# Patient Record
Sex: FEMALE | Race: BLACK OR AFRICAN AMERICAN | NOT HISPANIC OR LATINO | Employment: FULL TIME
[De-identification: names, ages, dates, MRNs, and addresses within clinical notes are randomized per-mention and may not be internally consistent; named-entity substitution may affect disease eponyms.]

---

## 2017-09-03 ENCOUNTER — HEALTH MAINTENANCE LETTER (OUTPATIENT)
Age: 37
End: 2017-09-03

## 2018-05-14 ENCOUNTER — CARE COORDINATION (OUTPATIENT)
Dept: SURGERY | Facility: CLINIC | Age: 38
End: 2018-05-14

## 2018-05-14 NOTE — PROGRESS NOTES
Called and left message in regards to patient needing to complete questionnaire prior to appointment and if unable needs to come to clinic at 8 am. I left my number for patient to contact me with any questions or concerns.

## 2018-06-04 ENCOUNTER — CARE COORDINATION (OUTPATIENT)
Dept: SURGERY | Facility: CLINIC | Age: 38
End: 2018-06-04

## 2018-06-04 NOTE — PROGRESS NOTES
Left message with my number for patient to call back to discuss new patient question. Also let her know to go on her my chart and she could complete questionnaire there also.

## 2018-06-06 ENCOUNTER — ALLIED HEALTH/NURSE VISIT (OUTPATIENT)
Dept: GASTROENTEROLOGY | Facility: CLINIC | Age: 38
End: 2018-06-06
Payer: COMMERCIAL

## 2018-06-06 ENCOUNTER — OFFICE VISIT (OUTPATIENT)
Dept: SURGERY | Facility: CLINIC | Age: 38
End: 2018-06-06
Payer: COMMERCIAL

## 2018-06-06 VITALS
HEART RATE: 106 BPM | HEIGHT: 62 IN | WEIGHT: 293 LBS | BODY MASS INDEX: 53.92 KG/M2 | SYSTOLIC BLOOD PRESSURE: 148 MMHG | DIASTOLIC BLOOD PRESSURE: 96 MMHG | TEMPERATURE: 99 F | OXYGEN SATURATION: 97 %

## 2018-06-06 DIAGNOSIS — R06.83 SNORING: Primary | ICD-10-CM

## 2018-06-06 DIAGNOSIS — E88.09 HYPOALBUMINEMIA DUE TO PROTEIN-CALORIE MALNUTRITION (H): ICD-10-CM

## 2018-06-06 DIAGNOSIS — E46 HYPOALBUMINEMIA DUE TO PROTEIN-CALORIE MALNUTRITION (H): ICD-10-CM

## 2018-06-06 DIAGNOSIS — E55.9 VITAMIN D DEFICIENCY: Primary | ICD-10-CM

## 2018-06-06 DIAGNOSIS — E66.01 MORBID OBESITY (H): ICD-10-CM

## 2018-06-06 DIAGNOSIS — E66.01 MORBID OBESITY (H): Primary | ICD-10-CM

## 2018-06-06 DIAGNOSIS — D50.8 IRON DEFICIENCY ANEMIA SECONDARY TO INADEQUATE DIETARY IRON INTAKE: ICD-10-CM

## 2018-06-06 DIAGNOSIS — Z71.3 NUTRITIONAL COUNSELING: ICD-10-CM

## 2018-06-06 LAB
ALBUMIN SERPL-MCNC: 3 G/DL (ref 3.4–5)
ALP SERPL-CCNC: 118 U/L (ref 40–150)
ALT SERPL W P-5'-P-CCNC: 15 U/L (ref 0–50)
ANION GAP SERPL CALCULATED.3IONS-SCNC: 8 MMOL/L (ref 3–14)
AST SERPL W P-5'-P-CCNC: 11 U/L (ref 0–45)
BILIRUB SERPL-MCNC: 0.3 MG/DL (ref 0.2–1.3)
BUN SERPL-MCNC: 10 MG/DL (ref 7–30)
CALCIUM SERPL-MCNC: 9.1 MG/DL (ref 8.5–10.1)
CHLORIDE SERPL-SCNC: 102 MMOL/L (ref 94–109)
CO2 SERPL-SCNC: 27 MMOL/L (ref 20–32)
CREAT SERPL-MCNC: 0.72 MG/DL (ref 0.52–1.04)
ERYTHROCYTE [DISTWIDTH] IN BLOOD BY AUTOMATED COUNT: 15.6 % (ref 10–15)
GFR SERPL CREATININE-BSD FRML MDRD: >90 ML/MIN/1.7M2
GLUCOSE SERPL-MCNC: 90 MG/DL (ref 70–99)
HBA1C MFR BLD: 6 % (ref 0–5.6)
HCT VFR BLD AUTO: 39.7 % (ref 35–47)
HGB BLD-MCNC: 12 G/DL (ref 11.7–15.7)
MCH RBC QN AUTO: 23.8 PG (ref 26.5–33)
MCHC RBC AUTO-ENTMCNC: 30.2 G/DL (ref 31.5–36.5)
MCV RBC AUTO: 79 FL (ref 78–100)
PLATELET # BLD AUTO: 535 10E9/L (ref 150–450)
POTASSIUM SERPL-SCNC: 4 MMOL/L (ref 3.4–5.3)
PROT SERPL-MCNC: 8.4 G/DL (ref 6.8–8.8)
PTH-INTACT SERPL-MCNC: 92 PG/ML (ref 18–80)
RBC # BLD AUTO: 5.05 10E12/L (ref 3.8–5.2)
SODIUM SERPL-SCNC: 137 MMOL/L (ref 133–144)
WBC # BLD AUTO: 12.7 10E9/L (ref 4–11)

## 2018-06-06 NOTE — MR AVS SNAPSHOT
"              After Visit Summary   6/6/2018    Svetlana Perry    MRN: [id number]           Patient Information     Date Of Birth          1980        Visit Information        Provider Department      6/6/2018 11:00 AM Mabel Agrawal RD [facility]        Care Instructions    GOALS:  Relating To Eating:  Eat slowly (20-30 minutes per meal), chewing foods well (25 chews per bite/applesauce consistency)  9\" Plate method (1/2 plate non-starchy vegetables/fruit, 1/4 plate lean protein, 1/4 plate whole grain starch - no more than 1/2 cup carb/meal)  Focus on lean protein and non-starchy vegetables/whole fruit at each meal with no more than 1 cup of carbs or 2 slices of bread  Record food and beverage intake (continue using spark people joana or other joana you like or notebook or online).  Eat breakfast daily, within 60 minutes of waking  Avoid distractions such as tv and computer while eating.  Eat 3 balanced meals per day. Okay to replace one meal per day with a protein drink.  Avoid snacking unless planned due to a long stretch (longer than 4-6 hours between meals) or truly hungry then have a healthy planned snack of 100 calories per less (fruit serving, lowfat cheese stick, Hard boiled egg, 12 almonds or cashews, vegetables with 2 T hummus).    Relating to beverages:  Reduce calorie containing beverages (juice) down to 8 oz or less per day. (with eventual goal of eliminating).  Avoid carbonated drinks    Relating to dietary supplements:  Continue taking a multivitamin containing iron daily    Relating to activity:  Increase activity level.  Exercise 4 days/week for 30 minutes    Relating to cravings:  Anytime you have a craving, find an activity and do it for 15 minutes to see if craving goes away    Follow-Up:   Recommend follow up visit in 1 month to assist with lifestyle changes or per insurance.  Mabel Agrawal, MS, RD, LD          Follow-ups after your visit        Future tests " that were ordered for you today     Open Future Orders        Priority Expected Expires Ordered    CBC with platelets Routine  6/6/2019 6/6/2018    Comprehensive metabolic panel Routine  6/6/2019 6/6/2018    Hemoglobin A1c Routine  9/4/2018 6/6/2018    Vitamin D Deficiency Routine  6/6/2019 6/6/2018    Parathyroid Hormone Intact Routine  6/6/2019 6/6/2018    SLEEP EVALUATION & MANAGEMENT REFERRAL - [facility]  [phone] (Age 18 and up) Routine  6/6/2019 6/6/2018            Who to contact     Please call your clinic at [phone] to:    Ask questions about your health    Make or cancel appointments    Discuss your medicines    Learn about your test results    Speak to your doctor            Additional Information About Your Visit        [employer] Information     [employer]ept gives you secure access to your electronic health record. If you see a primary care provider, you can also send messages to your care team and make appointments. If you have questions, please call your primary care clinic.  If you do not have a primary care provider, please call [phone] and they will assist you.      Re[employer]ept is an electronic gateway that provides easy, online access to your medical records. With Re[employer]ept, you can request a clinic appointment, read your test results, renew a prescription or communicate with your care team.     To access your existing account, please contact your [facility] or call [phone] for assistance.        Care EveryWhere ID     This is your Care EveryWhere ID. This could be used by other organizations to access your [facility] medical records  [id number]         Blood Pressure from Last 3 Encounters:   06/06/18 (!) 148/96   05/01/15 122/88   02/07/12 122/73    Weight from Last 3 Encounters:   06/06/18 (!) 170.7 kg (376 lb 4.8 oz)   05/01/15 (!) 152 kg (335 lb)   02/07/12 (!) 145.8 kg (321 lb 6.9 oz)              Today, you had the  following     No orders found for display       Primary Care Provider Office Phone # Fax #    Edel Herzog [phone] [address]  [facility] [address]        Equal Access to Services     ENOCH MEDRANO : Hadii sherry mcneil paraso Sonerisali, waaxda luqadaha, qaybta kaalmada adeegyada, sigrid albertn sherri herron cordell dow. So [facility] [phone].    ATENCIÓN: Si habla español, tiene a ghosh disposición servicios gratuitos de asistencia lingüística. Llame al [phone].    We comply with applicable federal civil rights laws and [place] laws. We do not discriminate on the basis of race, color, national origin, age, disability, sex, sexual orientation, or gender identity.            Thank you!     Thank you for choosing [facility]  for your care. Our goal is always to provide you with excellent care. Hearing back from our patients is one way we can continue to improve our services. Please take a few minutes to complete the written survey that you may receive in the mail after your visit with us. Thank you!             Your Updated Medication List - Protect others around you: Learn how to safely use, store and throw away your medicines at www.disposemymeds.org.          This list is accurate as of 6/6/18 12:02 PM.  Always use your most recent med list.                   Brand Name Dispense Instructions for use Diagnosis    buPROPion 75 MG tablet    WELLBUTRIN    60 tablet    Take 1 tablet (75 mg) by mouth 2 times daily    Depression, Anxiety

## 2018-06-06 NOTE — PROGRESS NOTES
"New Bariatric Nutrition Consultation Note    Reason For Visit: Nutrition Assessment    Svetlana Perry is a 37 year old presenting today for new bariatric nutrition consult.  Pt is interested in laparoscopic sleeve gastrectomy with Dr. Reyna with possible surgery in September 2018. This is patient's 1st of 3 required pre-op visits. Patient is accompanied by her spouse.    She is interested in having weight loss surgery for the following reasons:  To improved health    Support System Reviewed With Patient 5/15/2018   Who do you have in your support network that can be available to help you for the first 2 weeks after surgery? Mother, Daughter, Sister and Spouse   Who can you count on for support throughout your weight loss surgery journey? Mother, Daughter, Sister, Spouse, Friends and Bariatric [place] Community (online and in-person support groups)       ANTHROPOMETRICS:  Estimated body mass index is 67.94 kg/(m^2) as calculated from the following:    Height as of an earlier encounter on 6/6/18: 1.585 m (5' 2.4\").    Weight as of an earlier encounter on 6/6/18: 170.7 kg (376 lb 4.8 oz).    Required weight loss goal pre-op: -37 lbs from initial consult weight (goal weight 339 lbs or less before surgery)       6/6/2018   I have tried the following methods to lose weight Watching portions or calories, Exercise, Weight Watchers, Atkins type diet (low carb/high protein), Prescription Medications       Weight Loss Questions Reviewed With Patient 6/6/2018   How long have you been overweight? Since late 20's to early 40's       SUPPLEMENT INFORMATION:  Prenatal vitamin with iron and biotin.    NUTRITION HISTORY: Patient reported skips meals (1-2 per day) may be breakfast or lunch.  Finds that mid-day (~2-4pm, she gets very hungry and then comes home and will snack. Tends to have potato chips before snack before dinner/mid-day and another one if skips a meal such as nuts/granola. Used to drink soda but not over past month " and a half. Reported that she loves juice and drinks 32 oz per day. Uses Spark people phone joana. Eats lunch at work desk while using computer but trying to stop this. Eats dinner or meals at home in front of the TV. Will eat her breakfast meal when having one in car.    Recall Diet Questions Reviewed With Patient 6/6/2018   Describe what you typically consume for breakfast (typical or most recent): Premier protein drink OR [employer] bagel with cream cheese OR 2 HB eggs   Describe what you typically consume for lunch (typical or most recent): fast food, leftovers, OR premier protein    Describe what you typically consume for supper (typical or most recent): Chicken/fish with usually mashed potato occas rice and spinach/annette/mustard greens/turnip green OR 2 slices hand tossed cheese pizza OR 2-3 hard shell tacos (beef, L, T, sour cream, guac, cilantro and cheese and salsa   Describe what you typically consume as snacks (typical or most recent): May have nuts or granola bar if skips a meal beef jerky, snack bars, candy, chips   How many ounces of water, or other low calorie drinks, do you drink daily (8 oz=1 glass)? 60 oz per day recently   How many ounces of caffeine (coffee, tea, pop) do you drink daily (8 oz=1 glass)? 8 oz black/green tea with honey   How many ounces of carbonated (pop, beer, sparkling water) drinks do you drinky daily (8 oz=1 glass)? None over past month and a half   How many ounces of juice, pop, sweet tea, sports drinks, protein drinks, other sweetened drinks, do you drink daily (8 oz=1 glass)? 32 oz cran-grape or grape juice or apple juice. [place]ham energy drinks but none over the past two week   How many ounces of milk do you drink daily (8 oz=1 glass) 8 oz or less per day only on cereal   Please indicate the type of milk: 1%   How often do you drink alcohol? none       Eating Habits 6/6/2018   Do you have any dietary restrictions? -   Do you currently binge eat (eat a large amount of food in a  short time)? Yes   Are you an emotional eater? Yes   Do you get up to eat after falling asleep? No   What foods do you crave? suger       ADDITIONAL INFORMATION:      Dining Out History Reviewed With Patient 6/6/2018   How often do you dine out? A couple of times a week.   Where do you dine out? (select all that apply) sit-down restaurants, fast food chains, take out   What types of food do you order when you dine out? varies       Physical Activity Reviewed With Patient 5/15/2018   How often do you exercise? 1 to 2 times per week   What is the duration of your exercise (in minutes)? 20 Minutes   What types of exercise do you do? walking, gym membership, swimming, home gym but has not put together yet, has a stationary bike at home, also rowing machine at home climbing stairs at work   What keeps you from being more active?  I should be more active but I just have not gotten around to it, Shortness of breath, Too tired       NUTRITION DIAGNOSIS:  Food and nutrition-related knowledge deficit related to lack of previous diet education re: WLS as evidenced by pt report.    Obesity r/t long history of self-monitoring deficit and excessive energy intake aeb BMI >30.    INTERVENTION:  Intervention Provided/Education Provided on post-op diet guidelines, vitamins/minerals essential post-operatively, GI anatomy of bariatric surgeries, ways to help prepare for post-op diet guidelines pre-operatively, portion/calorie-control, mindful eating. Recommended using The Plate method for guidance on getting balanced meals and portion sizes. Recommended pt eat 3 balanced meals per day, no skipping. Told pt okay to replace one meal per day with a protein drink and reviewed criteria for protein drinks. Encouraged her to avoid eating meals while driving or watching tv or using computer. Explained recommendation to avoid liquid calories/juice and recommended decreasing at this time down to 1 c or less per day with goal of eliminating. See  "all recommendations/goals below. Provided pt with list of goals RD contact information.      Questions Reviewed With Patient 5/15/2018   How ready are you to make changes regarding your weight? Number 1 = Not ready at all to make changes up to 10 = very ready. 9   How confident are you that you can change? 1 = Not confident that you will be successful making changes up to 10 = very confident. 9     Patient Understanding: good  Expected Compliance: good    GOALS:  Relating To Eating:  Eat slowly (20-30 minutes per meal), chewing foods well (25 chews per bite/applesauce consistency)  9\" Plate method (1/2 plate non-starchy vegetables/fruit, 1/4 plate lean protein, 1/4 plate whole grain starch - no more than 1/2 cup carb/meal)  Focus on lean protein and non-starchy vegetables/whole fruit at each meal with no more than 1 cup of carbs or 2 slices of bread  Record food and beverage intake (continue using spark people joana or other joana you like or notebook or online).  Eat breakfast daily, within 60 minutes of waking  Avoid distractions such as tv and computer while eating.  Eat 3 balanced meals per day. Okay to replace one meal per day with a protein drink.  Avoid snacking unless planned due to a long stretch (longer than 4-6 hours between meals) or truly hungry then have a healthy planned snack of 100 calories per less (fruit serving, lowfat cheese stick, Hard boiled egg, 12 almonds or cashews, vegetables with 2 T hummus).    Relating to beverages:  Reduce calorie containing beverages (juice) down to 8 oz or less per day. (with eventual goal of eliminating).  Avoid carbonated drinks  Continue drinking 48-64 oz water per day    Relating to dietary supplements:  Continue taking a multivitamin containing iron daily    Relating to activity:  Increase activity level.  Exercise 4 days/week for 30 minutes    Relating to cravings:  Anytime you have a craving, find an activity and do it for 15 minutes to see if craving goes " away    Follow-Up:   Recommend follow up visit in 1 month to assist with lifestyle changes or per insurance.    Time spent with patient: 60 minutes.  Mabel Agrawal, MS, RD, LD

## 2018-06-06 NOTE — LETTER
"2018       RE: Svetlana Perry  [address] [place] [address]     Dear Colleague,    Thank you for referring your patient, Svetlana Perry, to the [facility]NAGEMENT at [facility]. Please see a copy of my visit note below.        New Bariatric Surgery Consultation Note    RE: Svetlana Perry  MR#: [id number]  : 1980      Referring provider: No flowsheet data found.    Chief Complaint/Reason for visit: evaluation for possible weight loss surgery    Dear Edel Herzog MD (General),    I had the pleasure of seeing your patient, Svetlana Perry, to evaluate her obesity and consider her for possible weight loss surgery. As you know, Svetlana Perry is 37 year old.  She has a height of 5' 2.402\", a weight of 376 lbs 4.8 oz, and calculated Body mass index is 67.94 kg/(m^2).    HISTORY OF PRESENT ILLNESS:  Weight Loss History Reviewed with Patient 2018   How long have you been overweight? Since late 20's to early 40's   What is the most that you have ever weighed? 379   What is the most weight you have lost? 30   I have tried the following methods to lose weight Watching portions or calories, Exercise, Weight Watchers, Atkins type diet (low carb/high protein), Prescription Medications   I have tried the following weight loss medications? (Check all that apply) Phentermine/Adipex-p/Suprenza   Have you ever had weight loss surgery? No   Took phentermine in the past and lost 30 lbs but discontinued due to side effects of insomnia, anxiety    CO-MORBIDITIES OF OBESITY INCLUDE:     2018   I have the following co-morbidities associated with obesity: Pre-Diabetes, Weight Bearing Joint Pain       PAST MEDICAL HISTORY:  Past Medical History:   Diagnosis Date     Anxiety      Depression      Esophageal reflux     After child birth     Hearing problem     No longer require hearing aid in right ear     Kidney stone 2009    Kidney " stones removed     Reflux        PAST SURGICAL HISTORY:  Past Surgical History:   Procedure Laterality Date     COLOSTOMY  1[birth year]    as an infant for ?etiology-closed at 8 months of age without problems since     GALLSTONE REMOVAL[       MAMMOPLASTY REDUCTION BILATERAL  2/7/2012    Procedure:MAMMOPLASTY REDUCTION BILATERAL; BILATERAL REDUCTION MAMMOPLASTY; Surgeon:JAROCHO WEISS; Location:[facility]       FAMILY HISTORY:   Family History   Problem Relation Age of Onset     DIABETES Maternal Grandmother      Depression Mother      Obesity Mother      Obesity Sister        SOCIAL HISTORY:   Social History Questions Reviewed With Patient 6/6/2018   Which best describes your employment status (select all that apply) -   If you work, what is your occupation? -   Which best describes your marital status: -   Do you have children? -   Who do you have in your support network that can be available to help you for the first 2 weeks after surgery? -   Who can you count on for support throughout your weight loss surgery journey? -   Can you afford 3 meals a day?  Yes   Can you afford 50-60 dollars a month for vitamins? Yes   Works as , desk job full time  Lives with partner  1 child age 11  Good support system      HABITS:     5/15/2018   How often do you drink alcohol? Never   Do you currently use any of the following Nicotine products? No   Have you ever used any of the following nicotine products? No   Have you or are you currently using street drugs or prescription strength medication for which you do not have a prescription for? No   Do you have a history of chemical dependency (alcohol or drug abuse)? No     PSYCHOLOGICAL HISTORY:   Psychological History Reviewed With Patient 5/15/2018   Have you ever attempted suicide? More than 10 years ago.   Have you had thoughts of suicide in the past year? No   Have you ever been hospitalized for mental illness or a suicide attempt? Never.   Do you have a history of  "chronic pain? No   Have you ever been diagnosed with fibromyalgia? No   Are you currently being treated for any of the following? (select all that apply) I do not have a mental illness   Attempted suicide teenage years    ROS:     5/15/2018   Skin:  Leg swelling   HEENT: Dizziness/lightheadedness, Missing teeth   If you answered yes to missing teeth, please indicate how many: 5   Musculoskeletal: Joint Pain, Back pain, Swelling of legs   Cardiovascular: Shortness of breath with activity   Pulmonary: Shortness of breath with activity, Snoring   Gastrointestinal: Reflux, Diarrhea, Constipation   Genitourinary: None of the above   Hematological: None of the above   Neurological: None of the above   Female only: Irregular menstrual cycles       EATING BEHAVIORS:     6/6/2018   Have you or anyone else thought that you had an eating disorder? -   Do you currently binge eat (eat a large amount of food in a short time)? Yes   Are you an emotional eater? Yes   Do you get up to eat after falling asleep? No       EXERCISE:     5/15/2018   How often do you exercise? 1 to 2 times per week   What is the duration of your exercise (in minutes)? 20 Minutes   What types of exercise do you do? walking, gym membership, swimming, home gym, climbing stairs at work   What keeps you from being more active?  I should be more active but I just have not gotten around to it, Shortness of breath, Too tired       MEDICATIONS:  Current Outpatient Prescriptions   Medication     buPROPion (WELLBUTRIN) 75 MG tablet     No current facility-administered medications for this visit.        ALLERGIES:  Allergies   Allergen Reactions     Seasonal Allergies      Vicodin [Hydrocodone-Acetaminophen] Nausea and Vomiting       LABS/IMAGING/MEDICAL RECORDS REVIEW:     PHYSICAL EXAM:  BP (!) 148/96  Pulse 106  Temp 99  F (37.2  C) (Oral)  Ht 5' 2.4\"  Wt (!) 376 lb 4.8 oz  SpO2 97%  BMI 67.94 kg/m2  General: NAD  Neurologic: A & O x 3, gait normal  Head: " "normocephalic, atraumatic  HEENT: PERRL, EOMI.   Respiratory: respirations unlabored  Abdomen: Obese, Soft NT ND   Extremities: No LE swelling   Skin: warm and dry.  No rashes on exposed skin  Psychiatric: Mentation and Affect appear normal    In summary, Svetlana Perry has Class III obesity with a body mass index of Body mass index is 67.94 kg/(m^2). kg/m2 and the comorbidities stated above. She completed an informational seminar and is a candidate for the laparoscopic gastric sleeve.  She will have to complete the following pre-requisites:    Possible lap sleeve gastrectomy in Sept 2018 with Dr Reyna  See dietitian in 1 month    Labs today first floor    Call to schedule psychological eval ASAP    Bariatric Task List  Status:  Is patient a candidate for bariatric surgery?:  Yes -     Status:  surgery evaluation in process -     Surgeon: Dr Reyna -     Tentative surgery month/year: Sept 2018 -        Insurance: Insurance:  [employer] -        Patient Info: Initial Weight:  376 -     Date of Initial Weight/Height:  6/6/2018 -     Goal Weight (lbs):  339 -     Required Weight Loss:  37 -     Surgery Type:  sleeve gastrectomy -        Dietician Visits: Structured weight loss required by insurance?:  Yes -     Dietician Visit 1:  Completed -     Dietician Visit 2:  Needed -     Dietician Visit 3:  Needed -     Dietician Visit additional:    -        Psychological Evaluation: Psych eval:  Needed -        Lab Work: Complete Blood Count:  Needed -     Comprehensive Metabolic Panel:  Needed -     Vitamin D:  Needed -     Hgb A1c:  Needed -     PTH:  Needed -        Consults/ Clearance: Sleep Medicine:  Needed -        PCP: Establish care with PCP:  Completed -     PCP letter of support:  Needed -        Patient Education:  Information Session:  Completed -     Given \"Making your decision\" handout?:  Yes -     Given support group information?:  Yes -     Support plan in place?:  Completed -     Research " consents signed?:  Yes -        Final Tasks:  Before surgery online class:  Needed -     Before surgery online class website link:  [email or web address]   After surgery online class:  Needed -     After surgery online class website link:  [email or web address]   Nurse visit for weigh-in and information:  Needed -     Pre-assessment clinic visit with anesthesia team for H&P:  Needed -     Final labs (Hgb, plt, T&S, UA):  Needed -        Notes:   -       Today in the office we discussed gastric sleeve surgery. Preoperative, perioperative, and postoperative processes, management, and follow up were addressed.  Risks and benefits were outlined including the risk of death, staple line leak (1-2%), PE, DVT, ulcer, worsening GERD, N/V, stricture, hernia, wound infection, weight regain, and vitamin deficiencies. I emphasized exercise and activity along with appropriate food choice as the main foundation for weight loss with surgery providing surgical reinforcement of this.  All questions were answered.  A goal sheet and support group handout were given to the patient.    Once the patient has completed the requirements in their task list and there are no further recommendations, the pt will be allowed to see the surgeon of her choice for consultation on the laparoscopic gastric sleeve surgery. Patient verbalizes understanding of the process to surgery and expectations for the postoperative period including the need for lifelong lifestyle changes, vitamin supplementation, and laboratory monitoring.     Sincerely,    Alice Mccain PA-C    I spent a total of 45 minutes face to face with Svetlana during today's office visit. Over 50% of this time was spent counseling the patient and/or coordinating care.

## 2018-06-06 NOTE — PROGRESS NOTES
"    New Bariatric Surgery Consultation Note    RE: Svetlana Perry  MR#: [id number]  : 1980      Referring provider: No flowsheet data found.    Chief Complaint/Reason for visit: evaluation for possible weight loss surgery    Dear Edel Herzog MD (General),    I had the pleasure of seeing your patient, Svetlana Perry, to evaluate her obesity and consider her for possible weight loss surgery. As you know, Svetlana Perry is 37 year old.  She has a height of 5' 2.402\", a weight of 376 lbs 4.8 oz, and calculated Body mass index is 67.94 kg/(m^2).    HISTORY OF PRESENT ILLNESS:  Weight Loss History Reviewed with Patient 2018   How long have you been overweight? Since late 20's to early 40's   What is the most that you have ever weighed? 379   What is the most weight you have lost? 30   I have tried the following methods to lose weight Watching portions or calories, Exercise, Weight Watchers, Atkins type diet (low carb/high protein), Prescription Medications   I have tried the following weight loss medications? (Check all that apply) Phentermine/Adipex-p/Suprenza   Have you ever had weight loss surgery? No   Took phentermine in the past and lost 30 lbs but discontinued due to side effects of insomnia, anxiety    CO-MORBIDITIES OF OBESITY INCLUDE:     2018   I have the following co-morbidities associated with obesity: Pre-Diabetes, Weight Bearing Joint Pain       PAST MEDICAL HISTORY:  Past Medical History:   Diagnosis Date     Anxiety      Depression      Esophageal reflux     After child birth     Hearing problem     No longer require hearing aid in right ear     Kidney stone     Kidney stones removed     Reflux        PAST SURGICAL HISTORY:  Past Surgical History:   Procedure Laterality Date     COLOSTOMY  82    as an infant for ?etiology-closed at 8 months of age without problems since     GALLSTONE REMOVAL[       MAMMOPLASTY REDUCTION BILATERAL  2012    " Procedure:MAMMOPLASTY REDUCTION BILATERAL; BILATERAL REDUCTION MAMMOPLASTY; Surgeon:JAROCHO WEISS; Location:[facility]       FAMILY HISTORY:   Family History   Problem Relation Age of Onset     DIABETES Maternal Grandmother      Depression Mother      Obesity Mother      Obesity Sister        SOCIAL HISTORY:   Social History Questions Reviewed With Patient 6/6/2018   Which best describes your employment status (select all that apply) -   If you work, what is your occupation? -   Which best describes your marital status: -   Do you have children? -   Who do you have in your support network that can be available to help you for the first 2 weeks after surgery? -   Who can you count on for support throughout your weight loss surgery journey? -   Can you afford 3 meals a day?  Yes   Can you afford 50-60 dollars a month for vitamins? Yes   Works as , desk job full time  Lives with partner  1 child age 11  Good support system      HABITS:     5/15/2018   How often do you drink alcohol? Never   Do you currently use any of the following Nicotine products? No   Have you ever used any of the following nicotine products? No   Have you or are you currently using street drugs or prescription strength medication for which you do not have a prescription for? No   Do you have a history of chemical dependency (alcohol or drug abuse)? No     PSYCHOLOGICAL HISTORY:   Psychological History Reviewed With Patient 5/15/2018   Have you ever attempted suicide? More than 10 years ago.   Have you had thoughts of suicide in the past year? No   Have you ever been hospitalized for mental illness or a suicide attempt? Never.   Do you have a history of chronic pain? No   Have you ever been diagnosed with fibromyalgia? No   Are you currently being treated for any of the following? (select all that apply) I do not have a mental illness   Attempted suicide teenage years    ROS:     5/15/2018   Skin:  Leg swelling   HEENT:  "Dizziness/lightheadedness, Missing teeth   If you answered yes to missing teeth, please indicate how many: 5   Musculoskeletal: Joint Pain, Back pain, Swelling of legs   Cardiovascular: Shortness of breath with activity   Pulmonary: Shortness of breath with activity, Snoring   Gastrointestinal: Reflux, Diarrhea, Constipation   Genitourinary: None of the above   Hematological: None of the above   Neurological: None of the above   Female only: Irregular menstrual cycles       EATING BEHAVIORS:     6/6/2018   Have you or anyone else thought that you had an eating disorder? -   Do you currently binge eat (eat a large amount of food in a short time)? Yes   Are you an emotional eater? Yes   Do you get up to eat after falling asleep? No       EXERCISE:     5/15/2018   How often do you exercise? 1 to 2 times per week   What is the duration of your exercise (in minutes)? 20 Minutes   What types of exercise do you do? walking, gym membership, swimming, home gym, climbing stairs at work   What keeps you from being more active?  I should be more active but I just have not gotten around to it, Shortness of breath, Too tired       MEDICATIONS:  Current Outpatient Prescriptions   Medication     buPROPion (WELLBUTRIN) 75 MG tablet     No current facility-administered medications for this visit.        ALLERGIES:  Allergies   Allergen Reactions     Seasonal Allergies      Vicodin [Hydrocodone-Acetaminophen] Nausea and Vomiting       LABS/IMAGING/MEDICAL RECORDS REVIEW:     PHYSICAL EXAM:  BP (!) 148/96  Pulse 106  Temp 99  F (37.2  C) (Oral)  Ht 5' 2.4\"  Wt (!) 376 lb 4.8 oz  SpO2 97%  BMI 67.94 kg/m2  General: NAD  Neurologic: A & O x 3, gait normal  Head: normocephalic, atraumatic  HEENT: PERRL, EOMI.   Respiratory: respirations unlabored  Abdomen: Obese, Soft NT ND   Extremities: No LE swelling   Skin: warm and dry.  No rashes on exposed skin  Psychiatric: Mentation and Affect appear normal    In summary, Svetlana Perry" "has Class III obesity with a body mass index of Body mass index is 67.94 kg/(m^2). kg/m2 and the comorbidities stated above. She completed an informational seminar and is a candidate for the laparoscopic gastric sleeve.  She will have to complete the following pre-requisites:    Possible lap sleeve gastrectomy in Sept 2018 with Dr Reyna  See dietitian in 1 month    Labs today first floor    Call to schedule psychological eval ASAP    Bariatric Task List  Status:  Is patient a candidate for bariatric surgery?:  Yes -     Status:  surgery evaluation in process -     Surgeon: Dr Reyna -     Tentative surgery month/year: Sept 2018 -        Insurance: Insurance:  [employer] -        Patient Info: Initial Weight:  376 -     Date of Initial Weight/Height:  6/6/2018 -     Goal Weight (lbs):  339 -     Required Weight Loss:  37 -     Surgery Type:  sleeve gastrectomy -        Dietician Visits: Structured weight loss required by insurance?:  Yes -     Dietician Visit 1:  Completed -     Dietician Visit 2:  Needed -     Dietician Visit 3:  Needed -    Dietician visit 4: Needed  Dietician visit 5: Needed  Dietican visit 6: Needed   Dietician Visit additional:    -        Psychological Evaluation: Psych eval:  Needed -        Lab Work: Complete Blood Count:  Needed -     Comprehensive Metabolic Panel:  Needed -     Vitamin D:  Needed -     Hgb A1c:  Needed -     PTH:  Needed -        Consults/ Clearance: Sleep Medicine:  Needed -        PCP: Establish care with PCP:  Completed -     PCP letter of support:  Needed -        Patient Education:  Information Session:  Completed -     Given \"Making your decision\" handout?:  Yes -     Given support group information?:  Yes -     Support plan in place?:  Completed -     Research consents signed?:  Yes -        Final Tasks:  Before surgery online class:  Needed -     Before surgery online class website link:  [email or web address]   After surgery online " class:  Needed -     After surgery online class website link:  [email or web address]   Nurse visit for weigh-in and information:  Needed -     Pre-assessment clinic visit with anesthesia team for H&P:  Needed -     Final labs (Hgb, plt, T&S, UA):  Needed -        Notes:   -       Today in the office we discussed gastric sleeve surgery. Preoperative, perioperative, and postoperative processes, management, and follow up were addressed.  Risks and benefits were outlined including the risk of death, staple line leak (1-2%), PE, DVT, ulcer, worsening GERD, N/V, stricture, hernia, wound infection, weight regain, and vitamin deficiencies. I emphasized exercise and activity along with appropriate food choice as the main foundation for weight loss with surgery providing surgical reinforcement of this.  All questions were answered.  A goal sheet and support group handout were given to the patient.    Once the patient has completed the requirements in their task list and there are no further recommendations, the pt will be allowed to see the surgeon of her choice for consultation on the laparoscopic gastric sleeve surgery. Patient verbalizes understanding of the process to surgery and expectations for the postoperative period including the need for lifelong lifestyle changes, vitamin supplementation, and laboratory monitoring.     Sincerely,    Alice Mccain PA-C    I spent a total of 45 minutes face to face with Svetlana during today's office visit. Over 50% of this time was spent counseling the patient and/or coordinating care.

## 2018-06-06 NOTE — MR AVS SNAPSHOT
"              After Visit Summary   6/6/2018    Svetlana Perry    MRN: [id number]           Patient Information     Date Of Birth          1980        Visit Information        Provider Department      6/6/2018 10:00 AM Alice Mccain PA-C [facility]        Today's Diagnoses     Snoring    -  1    Morbid obesity (H)          Care Instructions    Possible lap sleeve gastrectomy in Sept 2018 with Dr Reyna  See dietitian in 1 month    Labs today first floor    Call to schedule psychological eval ASAP    Bariatric Task List  Status:  Is patient a candidate for bariatric surgery?:  Yes -     Status:  surgery evaluation in process -     Surgeon: Dr Reyna -     Tentative surgery month/year: Sept 2018 -        Insurance: Insurance:  [employer] -        Patient Info: Initial Weight:  376 -     Date of Initial Weight/Height:  6/6/2018 -     Goal Weight (lbs):  339 -     Required Weight Loss:  37 -     Surgery Type:  sleeve gastrectomy -        Dietician Visits: Structured weight loss required by insurance?:  Yes -     Dietician Visit 1:  Completed -     Dietician Visit 2:  Needed -     Dietician Visit 3:  Needed -     Dietician Visit additional:    -        Psychological Evaluation: Psych eval:  Needed -        Lab Work: Complete Blood Count:  Needed -     Comprehensive Metabolic Panel:  Needed -     Vitamin D:  Needed -     Hgb A1c:  Needed -     PTH:  Needed -        Consults/ Clearance: Sleep Medicine:  Needed -        PCP: Establish care with PCP:  Completed -     PCP letter of support:  Needed -        Patient Education:  Information Session:  Completed -     Given \"Making your decision\" handout?:  Yes -     Given support group information?:  Yes -     Support plan in place?:  Completed -     Research consents signed?:  Yes -        Final Tasks:  Before surgery online class:  Needed -     Before surgery online class website link:  " [email or web address]   After surgery online class:  Needed -     After surgery online class website link:  [email or web address]   Nurse visit for weigh-in and information:  Needed -     Pre-assessment clinic visit with anesthesia team for H&P:  Needed -     Final labs (Hgb, plt, T&S, UA):  Needed -                    Follow-ups after your visit        Additional Services     SLEEP EVALUATION & MANAGEMENT REFERRAL - [facility]  [phone] (Age 18 and up)       Please be aware that coverage of these services is subject to the terms and limitations of your health insurance plan.  Call member services at your health plan with any benefit or coverage questions.      Please bring the following to your appointment:    >>   List of current medications   >>   This referral request   >>   Any documents/labs given to you for this referral                      Your next 10 appointments already scheduled     Jun 06, 2018 11:00 AM CDT   (Arrive by 10:45 AM)   NUTRITION VISIT with Jaylin Patton RD   [facility] ([facility])    [address] [place] [id number] [phone]              Future tests that were ordered for you today     Open Future Orders        Priority Expected Expires Ordered    CBC with platelets Routine  6/6/2019 6/6/2018    Comprehensive metabolic panel Routine  6/6/2019 6/6/2018    Hemoglobin A1c Routine  9/4/2018 6/6/2018    Vitamin D Deficiency Routine  6/6/2019 6/6/2018    Parathyroid Hormone Intact Routine  6/6/2019 6/6/2018    SLEEP EVALUATION & MANAGEMENT REFERRAL - [facility]  [phone] (Age 18 and up) Routine  6/6/2019 6/6/2018            Who to contact     Please call your clinic at [phone] to:    Ask questions about your health    Make or cancel appointments    Discuss your medicines    Learn about your test  "results    Speak to your doctor            Additional Information About Your Visit        [employer] Information     "[employer] gives you secure access to your electronic health record. If you see a primary care provider, you can also send messages to your care team and make appointments. If you have questions, please call your primary care clinic.  If you do not have a primary care provider, please call [phone] and they will assist you.      "[employer] is an electronic gateway that provides easy, online access to your medical records. With "[employer] you can request a clinic appointment, read your test results, renew a prescription or communicate with your care team.     To access your existing account, please contact your [facility] or call [phone] for assistance.        Care EveryWhere ID     This is your Care EveryWhere ID. This could be used by other organizations to access your [facility] medical records  [id number]        Your Vitals Were     Pulse Temperature Height Pulse Oximetry BMI (Body Mass Index)       106 99  F (37.2  C) (Oral) 5' 2.4\" 97% 67.94 kg/m2        Blood Pressure from Last 3 Encounters:   06/06/18 (!) 148/96   05/01/15 122/88   02/07/12 122/73    Weight from Last 3 Encounters:   06/06/18 (!) 376 lb 4.8 oz   05/01/15 (!) 335 lb   02/07/12 (!) 321 lb 6.9 oz               Primary Care Provider Office Phone # Fax #    Edel Kalpana Herzog [phone]       [address]  [facility] [address]        Equal Access to Services     [employer] [employer] AH: Hadii aad ku hadasho Soomaali, waaxda luqadaha, qaybta kaalmada adeegyada, sigrid ma'alex dow. So [facility] [phone].    ATENCIÓN: Si habla español, tiene a ghosh disposición servicios gratuitos de asistencia lingüística. Llame al [phone].    We comply with applicable federal civil rights laws and [place] laws. We do not discriminate on the basis of race, color, national origin, " age, disability, sex, sexual orientation, or gender identity.            Thank you!     Thank you for choosing [facility]  for your care. Our goal is always to provide you with excellent care. Hearing back from our patients is one way we can continue to improve our services. Please take a few minutes to complete the written survey that you may receive in the mail after your visit with us. Thank you!             Your Updated Medication List - Protect others around you: Learn how to safely use, store and throw away your medicines at www.disposemymeds.org.          This list is accurate as of 6/6/18 10:31 AM.  Always use your most recent med list.                   Brand Name Dispense Instructions for use Diagnosis    buPROPion 75 MG tablet    WELLBUTRIN    60 tablet    Take 1 tablet (75 mg) by mouth 2 times daily    Depression, Anxiety

## 2018-06-06 NOTE — PATIENT INSTRUCTIONS
"GOALS:  Relating To Eating:  Eat slowly (20-30 minutes per meal), chewing foods well (25 chews per bite/applesauce consistency)  9\" Plate method (1/2 plate non-starchy vegetables/fruit, 1/4 plate lean protein, 1/4 plate whole grain starch - no more than 1/2 cup carb/meal)  Focus on lean protein and non-starchy vegetables/whole fruit at each meal with no more than 1 cup of carbs or 2 slices of bread  Record food and beverage intake (continue using spark people joana or other joana you like or notebook or online).  Eat breakfast daily, within 60 minutes of waking  Avoid distractions such as tv and computer while eating.  Eat 3 balanced meals per day. Okay to replace one meal per day with a protein drink.  Avoid snacking unless planned due to a long stretch (longer than 4-6 hours between meals) or truly hungry then have a healthy planned snack of 100 calories per less (fruit serving, lowfat cheese stick, Hard boiled egg, 12 almonds or cashews, vegetables with 2 T hummus).    Relating to beverages:  Reduce calorie containing beverages (juice) down to 8 oz or less per day. (with eventual goal of eliminating).  Avoid carbonated drinks    Relating to dietary supplements:  Continue taking a multivitamin containing iron daily    Relating to activity:  Increase activity level.  Exercise 4 days/week for 30 minutes    Relating to cravings:  Anytime you have a craving, find an activity and do it for 15 minutes to see if craving goes away    Follow-Up:   Recommend follow up visit in 1 month to assist with lifestyle changes or per insurance.  Mabel Agrawal, MS, RD, LD  "

## 2018-06-06 NOTE — PATIENT INSTRUCTIONS
"Possible lap sleeve gastrectomy in Sept 2018 with Dr Reyna  See dietitian in 1 month    Labs today first floor    Call to schedule psychological eval ASAP    Bariatric Task List  Status:  Is patient a candidate for bariatric surgery?:  Yes -     Status:  surgery evaluation in process -     Surgeon: Dr Reyna -     Tentative surgery month/year: Sept 2018 -        Insurance: Insurance:  [employer] -        Patient Info: Initial Weight:  376 -     Date of Initial Weight/Height:  6/6/2018 -     Goal Weight (lbs):  339 -     Required Weight Loss:  37 -     Surgery Type:  sleeve gastrectomy -        Dietician Visits: Structured weight loss required by insurance?:  Yes -     Dietician Visit 1:  Completed -     Dietician Visit 2:  Needed -     Dietician Visit 3:  Needed -    Dietician visit 4: Needed  Dietician visit 5: Needed  Dietican visit 6: Needed   Dietician Visit additional:    -        Psychological Evaluation: Psych eval:  Needed -        Lab Work: Complete Blood Count:  Needed -     Comprehensive Metabolic Panel:  Needed -     Vitamin D:  Needed -     Hgb A1c:  Needed -     PTH:  Needed -        Consults/ Clearance: Sleep Medicine:  Needed -        PCP: Establish care with PCP:  Completed -     PCP letter of support:  Needed -        Patient Education:  Information Session:  Completed -     Given \"Making your decision\" handout?:  Yes -     Given support group information?:  Yes -     Support plan in place?:  Completed -     Research consents signed?:  Yes -        Final Tasks:  Before surgery online class:  Needed -     Before surgery online class website link:  [email or web address]   After surgery online class:  Needed -     After surgery online class website link:  [email or web address]   Nurse visit for weigh-in and information:  Needed -     Pre-assessment clinic visit with anesthesia team for H&P:  Needed -     Final labs (Hgb, plt, T&S, UA):  Needed -            "

## 2018-06-06 NOTE — NURSING NOTE
"( No chief complaint on file.   )    ( Weight: (!) 376 lb 4.8 oz )  ( Height: 5' 2.4\" )  ( BMI (Calculated): 68.08 )  ( Initial Weight: 376 lb 8 oz )  ( Cumulative weight loss (lbs): 0.2 )  (   )  (   )  ( Waist Circumference (cm): 162.5 cm )  (   )    ( BP: (!) 148/96 )  (   )  ( Temp: 99  F (37.2  C) )  ( Temp src: Oral )  ( Pulse: 106 )  (   )  ( SpO2: 97 % )    (   Patient Active Problem List   Diagnosis     Obesity     Supervision of other high-risk pregnancy     Scar condition and fibrosis of skin     POSITIVE GBS CULTURE AT TERM     CARDIOVASCULAR SCREENING; LDL GOAL LESS THAN 160     Depression     Anxiety    )  (   Current Outpatient Prescriptions   Medication Sig Dispense Refill     buPROPion (WELLBUTRIN) 75 MG tablet Take 1 tablet (75 mg) by mouth 2 times daily 60 tablet 0    )  ( Diabetes Eval:    )    ( Pain Eval:  Data Unavailable )    ( Wound Eval:       )    (   History   Smoking Status     Never Smoker   Smokeless Tobacco     Never Used    )    ( Signed By:  Tamiko Montenegro; June 6, 2018; 10:16 AM )    "

## 2018-06-07 LAB — DEPRECATED CALCIDIOL+CALCIFEROL SERPL-MC: 11 UG/L (ref 20–75)

## 2018-06-14 PROBLEM — D50.8 IRON DEFICIENCY ANEMIA SECONDARY TO INADEQUATE DIETARY IRON INTAKE: Status: ACTIVE | Noted: 2018-06-14

## 2018-06-14 PROBLEM — E55.9 VITAMIN D DEFICIENCY: Status: ACTIVE | Noted: 2018-06-14

## 2018-06-14 PROBLEM — E46 HYPOALBUMINEMIA DUE TO PROTEIN-CALORIE MALNUTRITION (H): Status: ACTIVE | Noted: 2018-06-14

## 2018-06-14 PROBLEM — E88.09 HYPOALBUMINEMIA DUE TO PROTEIN-CALORIE MALNUTRITION (H): Status: ACTIVE | Noted: 2018-06-14

## 2018-06-14 NOTE — PROGRESS NOTES
Thank you for getting labs done!    Your vitamin D level is low.    As you may know, vitamin D deficiency is associated with many medical problems including osteoporosis, muscles aches and pains, weakness and falls.  Maintaining adequate levels is very important for good health.  During winter months (October through March), people in northern climates are not able to synthesize vitamin D from sunlight and therefore have higher rates of deficiency due to inadquate oral intake. Vitamin D deficiency is very common in [place]!    I recommend taking a high dose supplement for 3 months, 50,000 units once per week.  You could consider rechecking your level when you have finished this course. I will send a prescription to your pharmacy.     After you are done with the high dose,  I recommend taking a daily supplement of 2000 units daily.      The low vitamin D seems to be impacting another hormone called the parathyroid, so I'd like to recheck both levels in a few months after you've taken the supplement.    Your lab test to check for diabetes, HgA1C, also called glycosylated hemoglobin, which measures the level of sugar in your blood over the past few months, is slightly higher than normal but less than 6.5. This is good news, it means you don't have diabetes right now but it does mean that you're at risk for developing diabetes in the future. Weight loss with definitely help lower this!    The testing of your blood sugar, kidney function, liver function and electrolytes was normal.     Your albumin level, or protein in your blood, is a little low. I recommend trying to get more protein in your diet, and try to get protein at every meal, including meat, beans, nuts (including peanut butter), dairy products, and protein powders you can add to smoothies, oatmeal, and baked goods.    Your blood count indicates that you might have been slighytly dehydrated and you might have low iron as well. I recommend return to clinic  for more labs to check your iron levels.    Sincerely,  Dr. Edel Herzog MD  6/14/2018        If you have any questions, please contact the clinic or schedule an appointment with me, thank you!    Sincerely,    Edel Herzog MD

## 2018-07-02 ENCOUNTER — ALLIED HEALTH/NURSE VISIT (OUTPATIENT)
Dept: GASTROENTEROLOGY | Facility: CLINIC | Age: 38
End: 2018-07-02
Payer: COMMERCIAL

## 2018-07-02 VITALS — WEIGHT: 293 LBS | HEIGHT: 62 IN | BODY MASS INDEX: 53.92 KG/M2

## 2018-07-02 DIAGNOSIS — Z71.3 NUTRITIONAL COUNSELING: ICD-10-CM

## 2018-07-02 DIAGNOSIS — E66.01 MORBID OBESITY (H): Primary | ICD-10-CM

## 2018-07-02 NOTE — MR AVS SNAPSHOT
"              After Visit Summary   7/2/2018    Svetlana Perry    MRN: [id number]           Patient Information     Date Of Birth          1980        Visit Information        Provider Department      7/2/2018 10:30 AM Mabel Agrawal RD M [facility]        Care Instructions    It was nice seeing you again today:    Relating To Eating:  Eat slowly (20-30 minutes per meal), chewing foods well (25 chews per bite/applesauce consistency)  Continue 9\" Plate method (1/2 plate non-starchy vegetables/fruit, 1/4 plate lean protein, 1/4 plate whole grain starch - no more than 1/2 cup carb/meal)  Focus on lean protein and non-starchy vegetables/whole fruit at each meal with no more than 1 cup of carbs or 2 slices of bread  Record food and beverage intake (continue using spark people joana or other joana you like or notebook or online).  Eat breakfast daily, within 60 minutes of waking  Avoid distractions such as tv and computer while eating.  Eat 3 balanced meals per day. Okay to replace one meal per day with a protein drink.  Avoid snacking unless planned due to a long stretch (longer than 4-6 hours between meals) or truly hungry then have a healthy planned snack of 100 calories per less (fruit serving, lowfat cheese stick, Hard boiled egg, 12 almonds or cashews, vegetables with 2 T hummus).    Relating to beverages:  Continue to avoid calorie containing beverages (juice) down to 8 oz or less per day. (with eventual goal of eliminating).  Continue to avoid carbonated drinks  Continue drinking 48-64 oz water per day  Do not add sweetened into green tea (discontinue honey).    Relating to dietary supplements:  Continue taking a multivitamin containing iron daily    Relating to activity:  Continue activity level.  Exercise 4 days/week for 30 minutes    Relating to cravings:  Anytime you have a craving, find an activity and do it for 15 minutes to see if craving goes away    Follow-Up: "   Recommend follow up visit in 1 month to assist with lifestyle changes or per insurance.    Mabel Agrawal, MS, RD, LD, [phone].            Follow-ups after your visit        Your next 10 appointments already scheduled     Jul 09, 2018  3:00 PM CDT   New Sleep Patient with Jensen Dallas MD   [facility] [place]    [address]  [facility] [address] [phone]            Aug 21, 2018  1:00 PM CDT   (Arrive by 12:45 PM)   PSYCH EVALUATION with Renate Gaines, PhD [facility] ([facility])    [address]  3rd [address]  [facility] [address] [phone]              Who to contact     Please call your clinic at [phone] to:    Ask questions about your health    Make or cancel appointments    Discuss your medicines    Learn about your test results    Speak to your doctor            Additional Information About Your Visit        WholeWorldBand Information     [employer] gives you secure access to your electronic health record. If you see a primary care provider, you can also send messages to your care team and make appointments. If you have questions, please call your primary care clinic.  If you do not have a primary care provider, please call [phone] and they will assist you.      [employer] is an electronic gateway that provides easy, online access to your medical records. With [employer], you can request a clinic appointment, read your test results, renew a prescription or communicate with your care team.     To access your existing account, please contact your [facility] or call [phone] for assistance.        Care EveryWhere ID     This is your Care EveryWhere ID. This could be used by other organizations to access your [place] medical records  [id number]        Your Vitals Were     Height BMI (Body Mass Index)                1.585 m (5'  "2.4\") 66.86 kg/m2           Blood Pressure from Last 3 Encounters:   06/06/18 (!) 148/96   05/01/15 122/88   02/07/12 122/73    Weight from Last 3 Encounters:   07/02/18 (!) 168 kg (370 lb 5 oz)   06/06/18 (!) 170.7 kg (376 lb 4.8 oz)   05/01/15 (!) 152 kg (335 lb)              Today, you had the following     No orders found for display       Primary Care Provider Office Phone # Fax #    Edel Herzog [phone]       [address]  [facility] [address]        Equal Access to Services     ENOCH MEDRANO : Gil Lopez, jayjay resendiz, qadee kaalmakecia meadows, sigrid landa . So [facility] [phone].    ATENCIÓN: Si habla español, tiene a ghosh disposición servicios gratuitos de asistencia lingüística. Llame al [phone].    We comply with applicable federal civil rights laws and [place] laws. We do not discriminate on the basis of race, color, national origin, age, disability, sex, sexual orientation, or gender identity.            Thank you!     Thank you for choosing [facility]  for your care. Our goal is always to provide you with excellent care. Hearing back from our patients is one way we can continue to improve our services. Please take a few minutes to complete the written survey that you may receive in the mail after your visit with us. Thank you!             Your Updated Medication List - Protect others around you: Learn how to safely use, store and throw away your medicines at www.disposemymeds.org.          This list is accurate as of 7/2/18 11:13 AM.  Always use your most recent med list.                   Brand Name Dispense Instructions for use Diagnosis    buPROPion 75 MG tablet    WELLBUTRIN    60 tablet    Take 1 tablet (75 mg) by mouth 2 times daily    Depression, Anxiety       cholecalciferol 06897 units capsule    VITAMIN D3    12 capsule    Take 1 capsule (50,000 Units) by mouth once a week " for 12 doses    Vitamin D deficiency

## 2018-07-02 NOTE — PATIENT INSTRUCTIONS
"It was nice seeing you again today:    Relating To Eating:  Eat slowly (20-30 minutes per meal), chewing foods well (25 chews per bite/applesauce consistency)  Continue 9\" Plate method (1/2 plate non-starchy vegetables/fruit, 1/4 plate lean protein, 1/4 plate whole grain starch - no more than 1/2 cup carb/meal)  Focus on lean protein and non-starchy vegetables/whole fruit at each meal with no more than 1 cup of carbs or 2 slices of bread  Record food and beverage intake (continue using spark people joana or other joana you like or notebook or online).  Eat breakfast daily, within 60 minutes of waking  Avoid distractions such as tv and computer while eating.  Eat 3 balanced meals per day. Okay to replace one meal per day with a protein drink.  Avoid snacking unless planned due to a long stretch (longer than 4-6 hours between meals) or truly hungry then have a healthy planned snack of 100 calories per less (fruit serving, lowfat cheese stick, Hard boiled egg, 12 almonds or cashews, vegetables with 2 T hummus).    Relating to beverages:  Continue to avoid calorie containing beverages (juice) down to 8 oz or less per day. (with eventual goal of eliminating).  Continue to avoid carbonated drinks  Continue drinking 48-64 oz water per day  Do not add sweetened into green tea (discontinue honey).    Relating to dietary supplements:  Continue taking a multivitamin containing iron daily    Relating to activity:  Continue activity level.  Exercise 4 days/week for 30 minutes    Relating to cravings:  Anytime you have a craving, find an activity and do it for 15 minutes to see if craving goes away    Follow-Up:   Recommend follow up visit in 1 month to assist with lifestyle changes or per insurance.    Mabel Agrawal, MS, RD, LD, [phone].    "

## 2018-07-02 NOTE — PROGRESS NOTES
"Follow-up Bariatric Nutrition Consultation Note    Reason For Visit: Nutrition Assessment    Svetlana Perry is a 37 year old presenting today for follow-up bariatric nutrition consult.  Pt is interested in laparoscopic sleeve gastrectomy with Dr. Reyna with possible surgery in September 2018. This is patient's 2nd of 3 required pre-op visits. Patient is accompanied by her spouse and her daughter who she reported are very supportive and helpful with her weight loss efforts.    She is interested in having weight loss surgery for the following reasons:  To improved health    Support System Reviewed With Patient 5/15/2018   Who do you have in your support network that can be available to help you for the first 2 weeks after surgery? Mother, Daughter, Sister and Spouse   Who can you count on for support throughout your weight loss surgery journey? Mother, Daughter, Sister, Spouse, Friends and Bariatric [place] Community (online and in-person support groups)       ANTHROPOMETRICS:  Estimated body mass index is 67.94 kg/(m^2) as calculated from the following:    Height as of 6/6/18: 1.585 m (5' 2.4\").    Initial weight as of 6/6/2018: 376 lbs 8 oz    Current Weight as of 7/2/18: 370 lb 5 oz (170.7 kg). (-6 lbs from initial visit)    Required weight loss goal pre-op: -37 lbs from initial consult weight (goal weight 339 lbs or less before surgery)       6/6/2018   I have tried the following methods to lose weight Watching portions or calories, Exercise, Weight Watchers, Atkins type diet (low carb/high protein), Prescription Medications       Weight Loss Questions Reviewed With Patient 6/6/2018   How long have you been overweight? Since late 20's to early 40's     SUPPLEMENT INFORMATION:  Prenatal vitamin with iron and biotin. Will be starting prescription vitamin D (50,000 IU once per week for 3 months).    NUTRITION HISTORY: Patient reported that over the past month she has been mostly eating 3 meals per day (replaces " "breakfast with a protein drink or protein water many times). Has been taking time to eat and chewing food well before swallowing. Over the past week and a half had increased exercise and over past couple of days started using her stationary bike at home as well. See diet recall, activity and progress towards previous goals below.    Diet recall   Breakfast: premier protein drink OR protein H2O (15-16g protein,   Lunch: rotisseries chicken made into salad with grapes, olive oil abdullahi OR salmon with mixed veg and red skin potatoes OR vegetarian rice with shrimp  Dinner: same as lunch OR Chicken or salmon, vegetables and starch (typically mini red potatoes) OR taco salad   Snacks: 1 sometimes 2 planned snacks per day: applesauce or sugar free jello or fruit or cheese stick or 100 rolando quac or hummus with cucumbers or 100% fruit cup or SF popsicle  Beverages: 64+ oz water + sometimes water with SF flavor packets in water, 16 oz green tea with 1T honey.  discontinued juice and soda  Alcohol: never    Progress towards previous goals:  Relating To Eating:  Eat slowly (20-30 minutes per meal), chewing foods well (25 chews per bite/applesauce consistency)-working on this and has been getting food to applesauce consistency and taking 30 minutes to eat  9\" Plate method (1/2 plate non-starchy vegetables/fruit, 1/4 plate lean protein, 1/4 plate whole grain starch - no more than 1/2 cup carb/meal)-Meeting  Focus on lean protein and non-starchy vegetables/whole fruit at each meal with no more than 1 cup of carbs or 2 slices of bread-Meeting  Record food and beverage intake (continue using spark people joana or other joana you like or notebook or online).- Meeting. Started 1.5 weeks ago. (bariatrics joana and also using an excel spreadsheet at work).  Eat breakfast daily, within 60 minutes of waking-Meeting  Avoid distractions such as tv and computer while eating.-working on it  Eat 3 balanced meals per day. Okay to replace one meal per " day with a protein drink.- improved. Not skipping breakfast especially on workdays but on days off  (weekends) less structured. Has been meal prepping ahead of time.   Avoid snacking unless planned due to a long stretch (longer than 4-6 hours between meals) or truly hungry then have a healthy planned snack of 100 calories per less (fruit serving, lowfat cheese stick, Hard boiled egg, 12 almonds or cashews, vegetables with 2 T hummus).-Improved. Tends to have one planned snack right before 2pm on workdays due to hungry at that time. Sometimes has 2 planned snacks morning and afternoon. Will have cheese stick or 100 rolando guac if hungry by 2pm if truly hungry. If craving something sweet will 2T sugar free chocolate cool whip.    Relating to beverages:  Reduce calorie containing beverages (juice) down to 8 oz or less per day. (with eventual goal of eliminating).-Meeting  Avoid carbonated drinks-Meeting  Continue drinking 48-64 oz water per day-Meeting    Relating to dietary supplements:  Continue taking a multivitamin containing iron daily-Meeting    Relating to activity:  Increase activity level.  Exercise 4 days/week for 30 minutes- getting more active, especially over the past week and a half. Walking across building in hallway at work twice per day for ~10-15 minutes each time (totaling 20-30 minutes). A couple of days ago she started using her stationary bike at home for ~5-6 minutes and then does some sit-ups and dancing and stairs in a 30 minute period.    Relating to cravings:  Anytime you have a craving, find an activity and do it for 15 minutes to see if craving goes away- working on this and has been looking for other things to do.    ADDITIONAL INFORMATION:    Dining Out History Reviewed With Patient 6/6/2018   How often do you dine out? A couple of times a week.   Where do you dine out? (select all that apply) sit-down restaurants, fast food chains, take out   What types of food do you order when you dine  "out? varies     Physical Activity: ~4 times per week:  walking hallway at work (~10-15 minutes x 2 per day (totaling 20-30 minutes, had been doing some stairs at work as well. Over past couple of days doing stationary bike for 5-6 minutes then does some sit ups and dancing and then some stairs to get a total of 30 minutes.    NUTRITION DIAGNOSIS:  Previous: Food and nutrition-related knowledge deficit related to lack of previous diet education re: WLS as evidenced by pt report.    Previous and Current: Obesity r/t long history of self-monitoring deficit and excessive energy intake aeb BMI >30.-Ongoing    INTERVENTION:  Intervention Provided/Education Provided: reviewed pt's progress towards previous goals and collected diet recall. Gave tips and suggestions for following goals and meals and beverages. See all recommendations/goals below. Provided pt with list of goals and RD contact information. Pt with no further questions at this time.    Patient Understanding: good  Expected Compliance: good    GOALS:  Relating To Eating:  Eat slowly (20-30 minutes per meal), chewing foods well (25 chews per bite/applesauce consistency)  Continue 9\" Plate method (1/2 plate non-starchy vegetables/fruit, 1/4 plate lean protein, 1/4 plate whole grain starch - no more than 1/2 cup carb/meal)  Focus on lean protein and non-starchy vegetables/whole fruit at each meal with no more than 1 cup of carbs or 2 slices of bread  Record food and beverage intake (continue using spark people joana or other joana you like or notebook or online).  Eat breakfast daily, within 60 minutes of waking  Avoid distractions such as tv and computer while eating.  Eat 3 balanced meals per day. Okay to replace one meal per day with a protein drink.  Avoid snacking unless planned due to a long stretch (longer than 4-6 hours between meals) or truly hungry then have a healthy planned snack of 100 calories per less (fruit serving, lowfat cheese stick, Hard boiled egg, " 12 almonds or cashews, vegetables with 2 T hummus).    Relating to beverages:  Continue to avoid calorie containing beverages (juice) down to 8 oz or less per day. (with eventual goal of eliminating).  Continue to avoid carbonated drinks  Continue drinking 48-64 oz water per day  Do not add sweetened into green tea (discontinue honey).    Relating to dietary supplements:  Continue taking a multivitamin containing iron daily    Relating to activity:  Continue activity level.  Exercise 4 days/week for 30 minutes    Relating to cravings:  Anytime you have a craving, find an activity and do it for 15 minutes to see if craving goes away    Follow-Up:   Recommend follow up visit in 1 month to assist with lifestyle changes or per insurance.    Time spent with patient: 45 minutes.  Mabel Agrawal, MS, RD, LD

## 2018-07-20 ENCOUNTER — CARE COORDINATION (OUTPATIENT)
Dept: SURGERY | Facility: CLINIC | Age: 38
End: 2018-07-20

## 2018-07-20 NOTE — PROGRESS NOTES
"Tasklist updated.    Bariatric Task List  Status:  Is patient a candidate for bariatric surgery?:  Yes -     Cleared to schedule surgeon consult?:    -     Status:  surgery evaluation in process -     Surgeon: Dr Reyna -     Tentative surgery month/year: Sept 2018 -        Insurance: Insurance:  [employer] -        Patient Info: Initial Weight:  376 -     Date of Initial Weight/Height:  6/6/2018 -     Goal Weight (lbs):  339 -     Required Weight Loss:  37 -     Surgery Type:  sleeve gastrectomy -        Dietician Visits: Structured weight loss required by insurance?:  Yes -     Dietician Visit 1:  Completed - 6/6/18 in Epic. bks   Dietician Visit 2:  Completed - 7/2/18 in Epic. [facility]   Dietician Visit 3:  Needed - 8/2/18 appt.      Psychological Evaluation: Psych eval:  Needed - 8/21/18 Dr Gaines appt.      Lab Work: Complete Blood Count:  Needed -     Comprehensive Metabolic Panel:  Needed -     Vitamin D:  Needed -     Hgb A1c:  Needed -     PTH:  Needed -        Consults/ Clearance: Sleep Medicine:  Needed -        PCP: Establish care with PCP:  Completed -     Follow up with PCP:    -     PCP letter of support:  Needed -        Patient Education:  Information Session:  Completed -     Given \"Making your decision\" handout?:  Yes -     Given support group information?:  Yes -     Attended support group?:    -     Support plan in place?:  Completed -     Research consents signed?:  Yes -        Final Tasks:  Before surgery online class:  Needed -     Before surgery online class website link:  [email or web address]   After surgery online class:  Needed -     After surgery online class website link:  [email or web address]   Nurse visit for weigh-in and information:  Needed -     Pre-assessment clinic visit with anesthesia team for H&P:  Needed -     Final labs (Hgb, plt, T&S, UA):  Needed -        Notes:   -           "

## 2018-08-08 DIAGNOSIS — E55.9 VITAMIN D DEFICIENCY: Primary | ICD-10-CM

## 2018-08-08 RX ORDER — CHOLECALCIFEROL (VITAMIN D3) 50 MCG
2000 TABLET ORAL DAILY
Qty: 100 TABLET | Refills: 3 | Status: SHIPPED | OUTPATIENT
Start: 2018-08-08

## 2018-08-21 ENCOUNTER — OFFICE VISIT (OUTPATIENT)
Dept: NEUROPSYCHOLOGY | Facility: CLINIC | Age: 38
End: 2018-08-21
Payer: COMMERCIAL

## 2018-08-21 DIAGNOSIS — F54 PSYCHOLOGICAL FACTORS AFFECTING MEDICAL CONDITION: Primary | ICD-10-CM

## 2018-08-21 DIAGNOSIS — E66.01 MORBID OBESITY (H): ICD-10-CM

## 2018-08-21 NOTE — MR AVS SNAPSHOT
After Visit Summary   8/21/2018    Svetlana Perry    MRN: [id number]           Patient Information     Date Of Birth          1980        Visit Information        Provider Department      8/21/2018 1:00 PM Renate Gaines, PhD [facility]        Today's Diagnoses     Psychological factors affecting medical condition    -  1    Morbid obesity (H)           Follow-ups after your visit        Your next 10 appointments already scheduled     Sep 13, 2018 11:00 AM CDT   (Arrive by 10:45 AM)   Return Nutrition with Mabel Agrawal RD   [facility] ([facility])    [address]  4th [address]  [facility] [id number] [phone]            Sep 20, 2018  2:00 PM CDT   New Sleep Patient with Jensen Dallas MD   [facility]    [address]  [facility] [address] [phone]              Who to contact     Please call your clinic at [phone] to:    Ask questions about your health    Make or cancel appointments    Discuss your medicines    Learn about your test results    Speak to your doctor            Additional Information About Your Visit        me[employer]ilibrium[employer]ch Information     [employer] gives you secure access to your electronic health record. If you see a primary care provider, you can also send messages to your care team and make appointments. If you have questions, please call your primary care clinic.  If you do not have a primary care provider, please call [phone] and they will assist you.      [employer] is an electronic gateway that provides easy, online access to your medical records. With [employer], you can request a clinic appointment, read your test results, renew a prescription or communicate with your care team.     To access your existing account, please contact your [facility] or call  [phone] for assistance.        Care EveryWhere ID     This is your Care EveryWhere ID. This could be used by other organizations to access your [place] medical records  [id number]         Blood Pressure from Last 3 Encounters:   08/24/18 127/90   06/06/18 (!) 148/96   05/01/15 122/88    Weight from Last 3 Encounters:   08/24/18 (!) 164.7 kg (363 lb)   07/02/18 (!) 168 kg (370 lb 5 oz)   06/06/18 (!) 170.7 kg (376 lb 4.8 oz)              We Performed the Following     76896-[id number] TEST BY PSYCHOLOGIST/MD, PER HR     C PSYCHIATRIC DIAGNOSTIC EVALUATION        Primary Care Provider Office Phone # Fax #    Edel Herzog [phone] [address]  [facility] [address]        Equal Access to Services     : Hadii aad tarun hadasho Soomaali, waaxda luqadaha, qaybta kaalmada adeegyada, sigrid aquinoin haysantosn sherri landa . So [facility] [phone].    ATENCIÓN: Si habla español, tiene a ghosh disposición servicios gratuitos de asistencia lingüística. [place] al [phone].    We comply with applicable federal civil rights laws and [place] laws. We do not discriminate on the basis of race, color, national origin, age, disability, sex, sexual orientation, or gender identity.            Thank you!     Thank you for choosing [facility]  for your care. Our goal is always to provide you with excellent care. Hearing back from our patients is one way we can continue to improve our services. Please take a few minutes to complete the written survey that you may receive in the mail after your visit with us. Thank you!             Your Updated Medication List - Protect others around you: Learn how to safely use, store and throw away your medicines at www.disposemymeds.org.          This list is accurate as of 8/21/18 11:59 PM.  Always use your most recent med list.                   Brand Name Dispense Instructions for use Diagnosis    buPROPion 75 MG tablet     WELLBUTRIN    60 tablet    Take 1 tablet (75 mg) by mouth 2 times daily    Depression, Anxiety       * cholecalciferol 50325 units capsule    VITAMIN D3    12 capsule    Take 1 capsule (50,000 Units) by mouth once a week for 12 doses    Vitamin D deficiency       * vitamin D 2000 units tablet     100 tablet    Take 2,000 Units by mouth daily    Vitamin D deficiency       * Notice:  This list has 2 medication(s) that are the same as other medications prescribed for you. Read the directions carefully, and ask your doctor or other care provider to review them with you.

## 2018-08-24 ENCOUNTER — OFFICE VISIT (OUTPATIENT)
Dept: FAMILY MEDICINE | Facility: CLINIC | Age: 38
End: 2018-08-24
Payer: COMMERCIAL

## 2018-08-24 VITALS
WEIGHT: 293 LBS | BODY MASS INDEX: 65.54 KG/M2 | HEART RATE: 84 BPM | OXYGEN SATURATION: 98 % | RESPIRATION RATE: 16 BRPM | SYSTOLIC BLOOD PRESSURE: 127 MMHG | TEMPERATURE: 98.2 F | DIASTOLIC BLOOD PRESSURE: 90 MMHG

## 2018-08-24 DIAGNOSIS — E55.9 VITAMIN D DEFICIENCY: ICD-10-CM

## 2018-08-24 DIAGNOSIS — Z00.00 ROUTINE HISTORY AND PHYSICAL EXAMINATION OF ADULT: Primary | ICD-10-CM

## 2018-08-24 DIAGNOSIS — E66.01 MORBID OBESITY (H): ICD-10-CM

## 2018-08-24 LAB
ALBUMIN SERPL-MCNC: 3 G/DL (ref 3.4–5)
ALP SERPL-CCNC: 110 U/L (ref 40–150)
ALT SERPL W P-5'-P-CCNC: 19 U/L (ref 0–50)
ANION GAP SERPL CALCULATED.3IONS-SCNC: 8 MMOL/L (ref 3–14)
AST SERPL W P-5'-P-CCNC: 10 U/L (ref 0–45)
BASOPHILS # BLD AUTO: 0 10E9/L (ref 0–0.2)
BASOPHILS NFR BLD AUTO: 0.2 %
BILIRUB SERPL-MCNC: 0.3 MG/DL (ref 0.2–1.3)
BUN SERPL-MCNC: 8 MG/DL (ref 7–30)
CALCIUM SERPL-MCNC: 9.2 MG/DL (ref 8.5–10.1)
CHLORIDE SERPL-SCNC: 101 MMOL/L (ref 94–109)
CHOLEST SERPL-MCNC: 146 MG/DL
CO2 SERPL-SCNC: 27 MMOL/L (ref 20–32)
CREAT SERPL-MCNC: 0.65 MG/DL (ref 0.52–1.04)
DIFFERENTIAL METHOD BLD: ABNORMAL
EOSINOPHIL # BLD AUTO: 0.2 10E9/L (ref 0–0.7)
EOSINOPHIL NFR BLD AUTO: 1.6 %
ERYTHROCYTE [DISTWIDTH] IN BLOOD BY AUTOMATED COUNT: 15.8 % (ref 10–15)
GFR SERPL CREATININE-BSD FRML MDRD: >90 ML/MIN/1.7M2
GLUCOSE SERPL-MCNC: 96 MG/DL (ref 70–99)
HCT VFR BLD AUTO: 38 % (ref 35–47)
HDLC SERPL-MCNC: 49 MG/DL
HGB BLD-MCNC: 11.4 G/DL (ref 11.7–15.7)
LDLC SERPL CALC-MCNC: 74 MG/DL
LYMPHOCYTES # BLD AUTO: 2.8 10E9/L (ref 0.8–5.3)
LYMPHOCYTES NFR BLD AUTO: 27.4 %
MCH RBC QN AUTO: 23.2 PG (ref 26.5–33)
MCHC RBC AUTO-ENTMCNC: 30 G/DL (ref 31.5–36.5)
MCV RBC AUTO: 77 FL (ref 78–100)
MONOCYTES # BLD AUTO: 1 10E9/L (ref 0–1.3)
MONOCYTES NFR BLD AUTO: 10.2 %
NEUTROPHILS # BLD AUTO: 6.1 10E9/L (ref 1.6–8.3)
NEUTROPHILS NFR BLD AUTO: 60.6 %
NONHDLC SERPL-MCNC: 97 MG/DL
PLATELET # BLD AUTO: 514 10E9/L (ref 150–450)
POTASSIUM SERPL-SCNC: 4.4 MMOL/L (ref 3.4–5.3)
PROT SERPL-MCNC: 8.3 G/DL (ref 6.8–8.8)
RBC # BLD AUTO: 4.92 10E12/L (ref 3.8–5.2)
SODIUM SERPL-SCNC: 136 MMOL/L (ref 133–144)
TRIGL SERPL-MCNC: 116 MG/DL
WBC # BLD AUTO: 10.1 10E9/L (ref 4–11)

## 2018-08-24 PROCEDURE — 82306 VITAMIN D 25 HYDROXY: CPT | Performed by: FAMILY MEDICINE

## 2018-08-24 PROCEDURE — 99395 PREV VISIT EST AGE 18-39: CPT | Performed by: FAMILY MEDICINE

## 2018-08-24 PROCEDURE — 36415 COLL VENOUS BLD VENIPUNCTURE: CPT | Performed by: FAMILY MEDICINE

## 2018-08-24 PROCEDURE — 80061 LIPID PANEL: CPT | Performed by: FAMILY MEDICINE

## 2018-08-24 PROCEDURE — 85025 COMPLETE CBC W/AUTO DIFF WBC: CPT | Performed by: FAMILY MEDICINE

## 2018-08-24 PROCEDURE — 80053 COMPREHEN METABOLIC PANEL: CPT | Performed by: FAMILY MEDICINE

## 2018-08-24 NOTE — MR AVS SNAPSHOT
After Visit Summary   8/24/2018    Svetlana Perry    MRN: [id number]           Patient Information     Date Of Birth          1980        Visit Information        Provider Department      8/24/2018 10:00 AM Mamta El MD [facility]        Today's Diagnoses     Routine history and physical examination of adult    -  1    Morbid obesity (H)        Vitamin D deficiency          Care Instructions      Before Your Surgery      Call your surgeon if there is any change in your health. This includes signs of a cold or flu (such as a sore throat, runny nose, cough, rash or fever).    Do not smoke, drink alcohol or take over the counter medicine (unless your surgeon or primary care doctor tells you to) for the 24 hours before and after surgery.    If you take prescribed drugs: Follow your doctor s orders about which medicines to take and which to stop until after surgery.    Eating and drinking prior to surgery: follow the instructions from your surgeon    Take a shower or bath the night before surgery. Use the soap your surgeon gave you to gently clean your skin. If you do not have soap from your surgeon, use your regular soap. Do not shave or scrub the surgery site.  Wear clean pajamas and have clean sheets on your bed.           Follow-ups after your visit        Your next 10 appointments already scheduled     Aug 27, 2018 11:00 AM CDT   NUTRITION VISIT with Mabel Agrawal RD   [facility] ([facility])    [address] [place] [id number] [phone]              Who to contact     If you have questions or need follow up information about today's clinic visit or your schedule please contact [facility] directly at [phone].  Normal or non-critical lab and imaging results will be communicated to you by MyChart, letter or phone within 4 business days  after the clinic has received the results. If you do not hear from us within 7 days, please contact the clinic through [employer] or phone. If you have a critical or abnormal lab result, we will notify you by phone as soon as possible.  Submit refill requests through [employer] or call your pharmacy and they will forward the refill request to us. Please allow 3 business days for your refill to be completed.          Additional Information About Your Visit        [employer] Information     [employer] gives you secure access to your electronic health record. If you see a primary care provider, you can also send messages to your care team and make appointments. If you have questions, please call your primary care clinic.  If you do not have a primary care provider, please call [phone] and they will assist you.        Care EveryWhere ID     This is your Care EveryWhere ID. This could be used by other organizations to access your [place] medical records  [id number]        Your Vitals Were     Pulse Temperature Respirations Pulse Oximetry Breastfeeding? BMI (Body Mass Index)    84 98.2  F (36.8  C) (Oral) 16 98% No 65.54 kg/m2       Blood Pressure from Last 3 Encounters:   08/24/18 127/90   06/06/18 (!) 148/96   05/01/15 122/88    Weight from Last 3 Encounters:   08/24/18 (!) 363 lb (164.7 kg)   07/02/18 (!) 370 lb 5 oz (168 kg)   06/06/18 (!) 376 lb 4.8 oz (170.7 kg)              We Performed the Following     25- OH-Vitamin D     CBC with platelets differential     Comprehensive metabolic panel     Lipid panel reflex to direct LDL Fasting        Primary Care Provider Office Phone # Fax #    Edel Herzog [phone] [address] [facility] [place] [address]        Equal Access to Services     ENOCH MEDRANO : Gil Lopez, jayjay resendiz, sigrid harmon. So [facility] [phone].    ATENCIÓN: naima Peters  disposición servicios gratuitos de asistencia lingüística. [place] colin [phone].    We comply with applicable federal civil rights laws and [place] laws. We do not discriminate on the basis of race, color, national origin, age, disability, sex, sexual orientation, or gender identity.            Thank you!     Thank you for choosing [facility]  for your care. Our goal is always to provide you with excellent care. Hearing back from our patients is one way we can continue to improve our services. Please take a few minutes to complete the written survey that you may receive in the mail after your visit with us. Thank you!             Your Updated Medication List - Protect others around you: Learn how to safely use, store and throw away your medicines at www.disposemymeds.org.          This list is accurate as of 8/24/18 12:26 PM.  Always use your most recent med list.                   Brand Name Dispense Instructions for use Diagnosis    buPROPion 75 MG tablet    WELLBUTRIN    60 tablet    Take 1 tablet (75 mg) by mouth 2 times daily    Depression, Anxiety       * cholecalciferol 18528 units capsule    VITAMIN D3    12 capsule    Take 1 capsule (50,000 Units) by mouth once a week for 12 doses    Vitamin D deficiency       * vitamin D 2000 units tablet     100 tablet    Take 2,000 Units by mouth daily    Vitamin D deficiency       * Notice:  This list has 2 medication(s) that are the same as other medications prescribed for you. Read the directions carefully, and ask your doctor or other care provider to review them with you.

## 2018-08-24 NOTE — PROGRESS NOTES
Presents today for an annual CPE in anticipation of gastric sleeve surgery in October- date yet to be determined.  No acute concerns.    Current Chronic Medical Conditions  Morbid obesity- anticipating gastric VSG surgery with Dr. Reyna in October  Vitamin D deficiency    Surgical History  Breast reduction 2012    Family History  Mom- healthy  Dad- healthy  8 siblings- healthy, one with recent weight loss surgery    Social History  .  One daughter 11.   for [email or web address].  Nonsmoker.  No EtOH history.  Just moved to [facility]  PAP 2016 [facility].      MEDICAL HISTORY:     Patient Active Problem List    Diagnosis Date Noted     Morbid obesity (H) 08/24/2018     Priority: Medium     Vitamin D deficiency 06/14/2018     Priority: Medium     Hypoalbuminemia due to protein-calorie malnutrition (H) 06/14/2018     Priority: Medium     Iron deficiency anemia secondary to inadequate dietary iron intake 06/14/2018     Priority: Medium     Depression      Priority: Medium     Anxiety      Priority: Medium     CARDIOVASCULAR SCREENING; LDL GOAL LESS THAN 160 01/18/2012     Priority: Medium     POSITIVE GBS CULTURE AT TERM 03/26/2007     Priority: Medium     Treat in labor.  AM       Scar condition and fibrosis of skin 01/22/2007     Priority: Medium     HAD COLOSTOMY AS A CHILD, SCARRING ON ABD  11/15- MFM US SHOWS FLUID COLLECTION IN ABD  1/22- SURGERY RECORDS REQUESTED____  MD CONSULT____       Obesity 09/18/2006     Priority: Medium     Body mass index is 59.43 kg/(m^2).  1/22/07- GCT- 102  Problem list name updated by automated process. Provider to review       Supervision of other high-risk pregnancy 09/18/2006     Priority: Medium     Fiance=Julius  Problem list name updated by automated process. Provider to review        Past Medical History:   Diagnosis Date     Anxiety      Depression      Esophageal reflux 2007    After child birth     Hearing problem 1991    No longer require hearing aid in  right ear     Kidney stone 2009    Kidney stones removed     Reflux      Past Surgical History:   Procedure Laterality Date     COLOSTOMY  1/1/[birth year]    as an infant for ?etiology-closed at 8 months of age without problems since     GALLSTONE REMOVAL[       MAMMOPLASTY REDUCTION BILATERAL  2/7/2012    Procedure:MAMMOPLASTY REDUCTION BILATERAL; BILATERAL REDUCTION MAMMOPLASTY; Surgeon:JAROCHO WEISS; Location[facility]     Current Outpatient Prescriptions   Medication Sig Dispense Refill     Cholecalciferol (VITAMIN D) 2000 units tablet Take 2,000 Units by mouth daily 100 tablet 3     cholecalciferol (VITAMIN D3) 18213 units capsule Take 1 capsule (50,000 Units) by mouth once a week for 12 doses 12 capsule 0     buPROPion (WELLBUTRIN) 75 MG tablet Take 1 tablet (75 mg) by mouth 2 times daily (Patient not taking: Reported on 8/24/2018) 60 tablet 0     OTC products: None, except as noted above    Allergies   Allergen Reactions     Seasonal Allergies      Vicodin [Hydrocodone-Acetaminophen] Nausea and Vomiting      Latex Allergy: NO    Social History   Substance Use Topics     Smoking status: Never Smoker     Smokeless tobacco: Never Used     Alcohol use No     History   Drug Use No       REVIEW OF SYSTEMS:   CONSTITUTIONAL: NEGATIVE for fever, chills, change in weight  INTEGUMENTARY/SKIN: NEGATIVE for worrisome rashes, moles or lesions  EYES: NEGATIVE for vision changes or irritation  ENT/MOUTH: NEGATIVE for ear, mouth and throat problems  RESP: NEGATIVE for significant cough or SOB  BREAST: NEGATIVE for masses, tenderness or discharge  CV: NEGATIVE for chest pain, palpitations or peripheral edema  GI: NEGATIVE for nausea, abdominal pain, heartburn, or change in bowel habits  : NEGATIVE for frequency, dysuria, or hematuria  MUSCULOSKELETAL: NEGATIVE for significant arthralgias or myalgia  NEURO: NEGATIVE for weakness, dizziness or paresthesias  ENDOCRINE: NEGATIVE for temperature intolerance, skin/hair changes  HEME:  NEGATIVE for bleeding problems  PSYCHIATRIC: NEGATIVE for changes in mood or affect    EXAM:   /90  Pulse 84  Temp 98.2  F (36.8  C) (Oral)  Resp 16  Wt (!) 363 lb (164.7 kg)  SpO2 98%  Breastfeeding? No  BMI 65.54 kg/m2    GENERAL APPEARANCE: healthy, alert and no distress     EYES: EOMI, PERRL     HENT: ear canals and TM's normal and nose and mouth without ulcers or lesions     NECK: no adenopathy, no asymmetry, masses, or scars and thyroid normal to palpation     RESP: lungs clear to auscultation - no rales, rhonchi or wheezes     CV: regular rates and rhythm, normal S1 S2, no S3 or S4 and no murmur, click or rub     ABDOMEN:  soft, nontender, no HSM or masses and bowel sounds normal     MS: extremities normal- no gross deformities noted, no evidence of inflammation in joints, FROM in all extremities.     SKIN: no suspicious lesions or rashes     NEURO: Normal strength and tone, sensory exam grossly normal, mentation intact and speech normal     PSYCH: mentation appears normal. and affect normal/bright     LYMPHATICS: No cervical adenopathy    DIAGNOSTICS:     pending  IMPRESSION:       ICD-10-CM    1. Routine history and physical examination of adult Z00.00 CBC with platelets differential     Comprehensive metabolic panel     Lipid panel reflex to direct LDL Fasting   2. Morbid obesity (H) E66.01    3. Vitamin D deficiency E55.9 25- OH-Vitamin D       RECOMMENDATIONS:   1. Routine history and physical examination of adult    - CBC with platelets differential  - Comprehensive metabolic panel  - Lipid panel reflex to direct LDL Fasting    Fasting labs today.  PAP UTD.    2. Morbid obesity (H)    Working with bariatric team to schedule sleeve surgery in October.    3. Vitamin D deficiency    - 25- OH-Vitamin D    Recheck Vitamin D level today.    Signed Electronically by: Mamta El MD

## 2018-08-25 ASSESSMENT — PATIENT HEALTH QUESTIONNAIRE - PHQ9: SUM OF ALL RESPONSES TO PHQ QUESTIONS 1-9: 2

## 2018-08-26 LAB — DEPRECATED CALCIDIOL+CALCIFEROL SERPL-MC: 22 UG/L (ref 20–75)
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
## 2018-09-13 ENCOUNTER — OFFICE VISIT (OUTPATIENT)
Dept: GASTROENTEROLOGY | Facility: CLINIC | Age: 38
End: 2018-09-13
Payer: COMMERCIAL

## 2018-09-13 VITALS — WEIGHT: 293 LBS | BODY MASS INDEX: 64.73 KG/M2

## 2018-09-13 DIAGNOSIS — Z71.3 NUTRITIONAL COUNSELING: ICD-10-CM

## 2018-09-13 DIAGNOSIS — E66.01 MORBID OBESITY (H): Primary | ICD-10-CM

## 2018-09-13 NOTE — MR AVS SNAPSHOT
After Visit Summary   9/13/2018    Svetlana Perry    MRN: [id number]           Patient Information     Date Of Birth          1980        Visit Information        Provider Department      9/13/2018 11:00 AM Mabel Agrawal RD [facility]        Care Instructions    It was nice seeing you again today:  Continue following all weight loss surgery guidelines and   Follow the Modified Liquid Diet for weight loss:  Breakfast: Protein Shake  Lunch: 3 oz lean protein + non-starchy vegetables. Ok for 1 starch serving (1 slice whole grain bread or 1/2 c potato/rice/corn/peas/sweet potato)  Dinner: Protein Shake  Snack: non-starchy vegetables (no calorie-containing dips/condiments)  Beverages: at least 48-64 oz water between meals daily    *Protein Shake Criteria: no more than 250 Calories, at least 20 grams of protein, and less than 10 grams of sugar     Meal Replacement Shake Options:   [facility] VLC smoothie (160 Calories, 20 g protein)   Premier Protein (160 Calories, 30 g protein)  Slim Fast Advanced Nutrition (180 Calories, 20 g protein)  Muscle Milk, lactose-free, 17 oz bottle (210 Calories, 30 g protein)  Integrated Supplements, no artificial sugars (110 Calories, 20 g protein)  Quest Protein Bars (190 Calories, 20 g protein)  No Cow Protein Bar, gluten, dairy, and soy free (200 Calories, 20 g protein)    Clear Liquid options:  BiPro  Premier Protein clear  [id number]   myDrugCosts Protein 15 Concentrate    Frozen Meal Replacements  Healthy Choice  Lean Cuisine  Atkins Meals  Smart Ones    Follow up visit in one month    Mabel Agrawal, Registered Dietitian, please call [phone].              Follow-ups after your visit        Your next 10 appointments already scheduled     Sep 20, 2018  2:00 PM CDT   New Sleep Patient with Jensen Dallas MD   [facility] [place] [place]    [address] [place] [address]  [id number] [phone]              Who to contact     Please call your clinic at [phone] to:    Ask questions about your health    Make or cancel appointments    Discuss your medicines    Learn about your test results    Speak to your doctor            Additional Information About Your Visit        MyChart Information     [email or web address] gives you secure access to your electronic health record. If you see a primary care provider, you can also send messages to your care team and make appointments. If you have questions, please call your primary care clinic.  If you do not have a primary care provider, please call [phone] and they will assist you.      [email or web address]@ is an electronic gateway that provides easy, online access to your medical records. With [email or web address]@, you can request a clinic appointment, read your test results, renew a prescription or communicate with your care team.     To access your existing account, please contact your [facility] or call [phone] for assistance.        Care EveryWhere ID     This is your Care EveryWhere ID. This could be used by other organizations to access your [place] medical records  [id number]        Your Vitals Were     BMI (Body Mass Index)                   64.73 kg/m2            Blood Pressure from Last 3 Encounters:   08/24/18 127/90   06/06/18 (!) 148/96   05/01/15 122/88    Weight from Last 3 Encounters:   09/13/18 (!) 162.6 kg (358 lb 8 oz)   08/24/18 (!) 164.7 kg (363 lb)   07/02/18 (!) 168 kg (370 lb 5 oz)              Today, you had the following     No orders found for display       Primary Care Provider Office Phone # Fax #    Edel Herzog [phone] [address]  [facility] [address]        Equal Access to Services     ENO[place] MEDRANO : Gil Lopez, jayjay resendiz, sigrid harmon. So [facility] [phone].    ATENCIÓN: Si sebastiánla  español, tiene a ghosh disposición servicios gratuitos de asistencia lingüística. [place] colin [phone].    We comply with applicable federal civil rights laws and [place] laws. We do not discriminate on the basis of race, color, national origin, age, disability, sex, sexual orientation, or gender identity.            Thank you!     Thank you for choosing [facility]  for your care. Our goal is always to provide you with excellent care. Hearing back from our patients is one way we can continue to improve our services. Please take a few minutes to complete the written survey that you may receive in the mail after your visit with us. Thank you!             Your Updated Medication List - Protect others around you: Learn how to safely use, store and throw away your medicines at www.disposemymeds.org.          This list is accurate as of 9/13/18 11:26 AM.  Always use your most recent med list.                   Brand Name Dispense Instructions for use Diagnosis    buPROPion 75 MG tablet    WELLBUTRIN    60 tablet    Take 1 tablet (75 mg) by mouth 2 times daily    Depression, Anxiety       vitamin D 2000 units tablet     100 tablet    Take 2,000 Units by mouth daily    Vitamin D deficiency

## 2018-09-13 NOTE — PATIENT INSTRUCTIONS
It was nice seeing you again today:  Continue following all weight loss surgery guidelines and   Follow the Modified Liquid Diet for weight loss:  Breakfast: Protein Shake  Lunch: 3 oz lean protein + non-starchy vegetables. Ok for 1 starch serving (1 slice whole grain bread or 1/2 c potato/rice/corn/peas/sweet potato)  Dinner: Protein Shake  Snack: non-starchy vegetables (no calorie-containing dips/condiments)  Beverages: at least 48-64 oz water between meals daily    *Protein Shake Criteria: no more than 250 Calories, at least 20 grams of protein, and less than 10 grams of sugar     Meal Replacement Shake Options:   [employer] C smoothie (160 Calories, 20 g protein)   Premier Protein (160 Calories, 30 g protein)  Slim Fast Advanced Nutrition (180 Calories, 20 g protein)  Muscle Milk, lactose-free, 17 oz bottle (210 Calories, 30 g protein)  Integrated Supplements, no artificial sugars (110 Calories, 20 g protein)  Quest Protein Bars (190 Calories, 20 g protein)  No Cow Protein Bar, gluten, dairy, and soy free (200 Calories, 20 g protein)    Clear Liquid options:  BiPro  Premier Protein clear  [id number]  [employer] Protein 15 Concentrate    Frozen Meal Replacements  Healthy Choice  Lean Cuisine  Atkins Meals  Smart Ones    Follow up visit in one month    Mabel Agrawal, Registered Dietitian, please call [phone].

## 2018-09-13 NOTE — LETTER
"9/13/2018       RE: Svetlana Perry  [address] [place] [address]     Dear Colleague,    Thank you for referring your patient, Svetlana Perry, to the [facility] at [facility]. Please see a copy of my visit note below.    Follow-up Bariatric Nutrition Consultation Note    Reason For Visit: Nutrition Assessment    Svetlana Perry is a 37 year old presenting today for follow-up bariatric nutrition consult.  Pt is interested in laparoscopic sleeve gastrectomy with Dr. Reyna with possible surgery in September 2018. This is patient's 3rd of 3 required pre-op visits.    She is interested in having weight loss surgery for the following reasons:  To improved health    Support System Reviewed With Patient 5/15/2018   Who do you have in your support network that can be available to help you for the first 2 weeks after surgery? Mother, 12 yo Daughter, Sister and Spouse/yani Julius   Who can you count on for support throughout your weight loss surgery journey? Mother, Daughter, Sister, Spouse, Friends and Bariat[facility] [place] Community (online and in-person support groups)       ANTHROPOMETRICS:  Estimated body mass index is 67.94 kg/(m^2) as calculated from the following:    Height as of 6/6/18: 1.585 m (5' 2.4\").    Initial weight as of 6/6/2018: 376 lbs 8 oz    Last month's Weight as of 7/2/18: 370 lb 5 oz (170.7 kg).   Current weight: 9/13/2018 358 lbs 8 oz (-12 lbs from last visit and -18 lbs from initial visit)    Required weight loss goal pre-op: -37 lbs from initial consult weight (goal weight 339 lbs or less before surgery)    SUPPLEMENT INFORMATION:  Prenatal vitamin with iron and biotin. Will be starting prescription vitamin D (50,000 IU once per week for 3 months).    NUTRITION HISTORY: Patient stated that she got off track in August due to her Julius lomeli's, nephew passed away. She felt that she was doing some binge eating/eat more on " "and off and got off track with her usual daily walks. She stated by August 15th, she got back on track with eating and daily walks with her mother. She reported following all of the weight loss surgery guidelines and weight down today 18 lbs from initial visit. Since pt continuing to work on losing more weight to reach goal, she is interested in starting the modified liquid diet. See current diet recall below as well as progress towards previous goals.    Diet recall   Breakfast: premier protein drink (peach or banana flavor and then adds decaf instant coffee and ice to help thin it out and flavor it)  Lunch: avocado withTurkey and sprouts lettuce wrap and side of pineapple OR homemade turkey and cabbage soup OR mj greens/kale with fish or chicken OR leftovers  Dinner: turkey taco lettuce wrap OR ground turkey/beef tigre or chicken tigre with cheese and side salad OR zucchini noodles with meat tomato sauce OR may have premier protein on way home from work and then 1 c bone broth with added spices at home.  Snacks: 1 sometimes 2 planned snacks per day: apple with PB or carrots/celery with hummus, 1 oz cheddar cheese  Beverages: 64+ oz water, occasional green tea.  Discontinued juice and soda  Alcohol: never    Progress towards previous goals:  Relating To Eating:  Eat slowly (20-30 minutes per meal), chewing foods well (25 chews per bite/applesauce consistency)-Meeting  Continue 9\" Plate method (1/2 plate non-starchy vegetables/fruit, 1/4 plate lean protein, 1/4 plate whole grain starch - no more than 1/2 cup carb/meal)-Meeting    Record food and beverage intake (continue using spark people joana or other joana you like or notebook or online).-Meeting. Using My Fitness pal.   Eat breakfast daily, within 60 minutes of waking-Mostly within 60 minutes sometimes 60-90 minutes  Avoid distractions such as tv and computer while eating.-Meeting  Eat 3 balanced meals per day. Okay to replace one meal per day with a protein " drink.-Meeting  Avoid snacking unless planned due to a long stretch (longer than 4-6 hours between meals) or truly hungry then have a healthy planned snack of 100 calories per less (fruit serving, lowfat cheese stick, Hard boiled egg, 12 almonds or cashews, vegetables with 2 T hummus).-Meeting    Relating to beverages:  Continue to avoid calorie containing beverages (juice) down to 8 oz or less per day. (with eventual goal of eliminating).-Meeting   Continue to avoid carbonated drinks-Meeting  Continue drinking 48-64 oz water per day-Meeting  Do not add sweetened into green tea (discontinue honey).-Meeting    Relating to dietary supplements:  Continue taking a multivitamin containing iron daily-Meeting    Relating to activity:  Continue activity level.  Exercise 4 days/week for 30 minutes-Meeting. Walking 5 nights per week for 30 minutes either with her mother or alone and on weekends goes to [employer] for swimming or walking in stores and mall. Active at work walking to/from meetings.    Relating to cravings:  Anytime you have a craving, find an activity and do it for 15 minutes to see if craving goes away-Meeting. 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    ADDITIONAL INFORMATION:    Physical Activity: Walking 5 nights per week for 30 minutes either with her mother or alone and on weekends goes to [employer] for swimming or walking in stores and mall. Active at work walking to/from meetings.    NUTRITION DIAGNOSIS:  Previous: Food and nutrition-related knowledge deficit related to lack of previous diet education re: WLS as evidenced by pt report.    Previous and Current: Obesity r/t long history of self-monitoring deficit and excessive energy intake aeb BMI >30.-Ongoing and improving.    INTERVENTION:  Intervention Provided/Education Provided: reviewed pt's progress towards previous goals and collected diet recall. Commended pt for changes made and weight loss. Pt is doing well with following guidelines and weight  loss. Recommended pt start modified liquid diet to assist with further weight loss and to reach goal weight. Reviewed this diet with pt and gave her written instructions. Provided pt with list of goals and RD contact information. Pt with no further questions at this time.    Patient Understanding: good  Expected Compliance: good    GOALS:  Continue following all weight loss surgery guidelines and     Start/Follow the Modified Liquid Diet for weight loss:  Breakfast: Protein Shake  Lunch: 3 oz lean protein + non-starchy vegetables. Ok for 1 starch serving (1 slice whole grain bread or 1/2 c potato/rice/corn/peas/sweet potato)  Dinner: Protein Shake  Snack: non-starchy vegetables (no calorie-containing dips/condiments)  Beverages: at least 48-64 oz water between meals daily    *Protein Shake Criteria: no more than 250 Calories, at least 20 grams of protein, and less than 10 grams of sugar     Meal Replacement Shake Options:   [employer] VLC smoothie (160 Calories, 20 g protein)   Premier Protein (160 Calories, 30 g protein)  Slim Fast Advanced Nutrition (180 Calories, 20 g protein)  Muscle Milk, lactose-free, 17 oz bottle (210 Calories, 30 g protein)  Integrated Supplements, no artificial sugars (110 Calories, 20 g protein)  Quest Protein Bars (190 Calories, 20 g protein)  No Cow Protein Bar, gluten, dairy, and soy free (200 Calories, 20 g protein)    Clear Liquid options:  BiPro  Premier Protein clear  [id number]  [employer] Protein 15 Concentrate    Frozen Meal Replacements  Healthy Choice  Lean Cuisine  Atkins Meals  Smart Ones    Follow-Up:   Recommend follow up visit in 1 month while continuing to work on weight loss to goal weight.    Time spent with patient: 30 minutes.  Mabel Agrawal, MS, RD, LD      Again, thank you for allowing me to participate in the care of your patient.      Sincerely,    Mabel Agrawal RD

## 2018-09-13 NOTE — PROGRESS NOTES
"Follow-up Bariatric Nutrition Consultation Note    Reason For Visit: Nutrition Assessment    Svetlana Perry is a 37 year old presenting today for follow-up bariatric nutrition consult.  Pt is interested in laparoscopic sleeve gastrectomy with Dr. Reyna with possible surgery in September 2018. This is patient's 3rd of 3 required pre-op visits.    She is interested in having weight loss surgery for the following reasons:  To improved health    Support System Reviewed With Patient 5/15/2018   Who do you have in your support network that can be available to help you for the first 2 weeks after surgery? Mother, 10 yo Daughter, Sister and Spouse/yani Madrid   Who can you count on for support throughout your weight loss surgery journey? Mother, Daughter, Sister, Spouse, Friends and B[facility] [place] Community (online and in-person support groups)       ANTHROPOMETRICS:  Estimated body mass index is 67.94 kg/(m^2) as calculated from the following:    Height as of 6/6/18: 1.585 m (5' 2.4\").    Initial weight as of 6/6/2018: 376 lbs 8 oz    Last month's Weight as of 7/2/18: 370 lb 5 oz (170.7 kg).   Current weight: 9/13/2018 358 lbs 8 oz (-12 lbs from last visit and -18 lbs from initial visit)    Required weight loss goal pre-op: -37 lbs from initial consult weight (goal weight 339 lbs or less before surgery)    SUPPLEMENT INFORMATION:  Prenatal vitamin with iron and biotin. Will be starting prescription vitamin D (50,000 IU once per week for 3 months).    NUTRITION HISTORY: Patient stated that she got off track in August due to her Julius lomeli's, nephew passed away. She felt that she was doing some binge eating/eat more on and off and got off track with her usual daily walks. She stated by August 15th, she got back on track with eating and daily walks with her mother. She reported following all of the weight loss surgery guidelines and weight down today 18 lbs from initial visit. Since pt continuing to work on losing " "more weight to reach goal, she is interested in starting the modified liquid diet. See current diet recall below as well as progress towards previous goals.    Diet recall   Breakfast: premier protein drink (peach or banana flavor and then adds decaf instant coffee and ice to help thin it out and flavor it)  Lunch: avocado withTurkey and sprouts lettuce wrap and side of pineapple OR homemade turkey and cabbage soup OR mj greens/kale with fish or chicken OR leftovers  Dinner: turkey taco lettuce wrap OR ground turkey/beef tigre or chicken tigre with cheese and side salad OR zucchini noodles with meat tomato sauce OR may have premier protein on way home from work and then 1 c bone broth with added spices at home.  Snacks: 1 sometimes 2 planned snacks per day: apple with PB or carrots/celery with hummus, 1 oz cheddar cheese  Beverages: 64+ oz water, occasional green tea.  Discontinued juice and soda  Alcohol: never    Progress towards previous goals:  Relating To Eating:  Eat slowly (20-30 minutes per meal), chewing foods well (25 chews per bite/applesauce consistency)-Meeting  Continue 9\" Plate method (1/2 plate non-starchy vegetables/fruit, 1/4 plate lean protein, 1/4 plate whole grain starch - no more than 1/2 cup carb/meal)-Meeting    Record food and beverage intake (continue using spark people joana or other joana you like or notebook or online).-Meeting. Using My Fitness pal.   Eat breakfast daily, within 60 minutes of waking-Mostly within 60 minutes sometimes 60-90 minutes  Avoid distractions such as tv and computer while eating.-Meeting  Eat 3 balanced meals per day. Okay to replace one meal per day with a protein drink.-Meeting  Avoid snacking unless planned due to a long stretch (longer than 4-6 hours between meals) or truly hungry then have a healthy planned snack of 100 calories per less (fruit serving, lowfat cheese stick, Hard boiled egg, 12 almonds or cashews, vegetables with 2 T " heather).-Meeting    Relating to beverages:  Continue to avoid calorie containing beverages (juice) down to 8 oz or less per day. (with eventual goal of eliminating).-Meeting   Continue to avoid carbonated drinks-Meeting  Continue drinking 48-64 oz water per day-Meeting  Do not add sweetened into green tea (discontinue honey).-Meeting    Relating to dietary supplements:  Continue taking a multivitamin containing iron daily-Meeting    Relating to activity:  Continue activity level.  Exercise 4 days/week for 30 minutes-Meeting. Walking 5 nights per week for 30 minutes either with her mother or alone and on weekends goes to [facility] for swimming or walking in stores and mall. Active at work walking to/from meetings.    Relating to cravings:  Anytime you have a craving, find an activity and do it for 15 minutes to see if craving goes away-Meeting. 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    ADDITIONAL INFORMATION:    Physical Activity: Walking 5 nights per week for 30 minutes either with her mother or alone and on weekends goes to [facility] for swimming or walking in stores and mall. Active at work walking to/from meetings.    NUTRITION DIAGNOSIS:  Previous: Food and nutrition-related knowledge deficit related to lack of previous diet education re: WLS as evidenced by pt report.    Previous and Current: Obesity r/t long history of self-monitoring deficit and excessive energy intake aeb BMI >30.-Ongoing and improving.    INTERVENTION:  Intervention Provided/Education Provided: reviewed pt's progress towards previous goals and collected diet recall. Commended pt for changes made and weight loss. Pt is doing well with following guidelines and weight loss. Recommended pt start modified liquid diet to assist with further weight loss and to reach goal weight. Reviewed this diet with pt and gave her written instructions. Provided pt with list of goals and RD contact information. Pt with no further questions at this  time.    Patient Understanding: good  Expected Compliance: good    GOALS:  Continue following all weight loss surgery guidelines and     Start/Follow the Modified Liquid Diet for weight loss:  Breakfast: Protein Shake  Lunch: 3 oz lean protein + non-starchy vegetables. Ok for 1 starch serving (1 slice whole grain bread or 1/2 c potato/rice/corn/peas/sweet potato)  Dinner: Protein Shake  Snack: non-starchy vegetables (no calorie-containing dips/condiments)  Beverages: at least 48-64 oz water between meals daily    *Protein Shake Criteria: no more than 250 Calories, at least 20 grams of protein, and less than 10 grams of sugar     Meal Replacement Shake Options:   [employer] VLC smoothie (160 Calories, 20 g protein)   Premier Protein (160 Calories, 30 g protein)  Slim Fast Advanced Nutrition (180 Calories, 20 g protein)  Muscle Milk, lactose-free, 17 oz bottle (210 Calories, 30 g protein)  Integrated Supplements, no artificial sugars (110 Calories, 20 g protein)  Quest Protein Bars (190 Calories, 20 g protein)  No Cow Protein Bar, gluten, dairy, and soy free (200 Calories, 20 g protein)    Clear Liquid options:  BiPro  Premier Protein clear  [id number]  [employer] Protein 15 Concentrate    Frozen Meal Replacements  Healthy Choice  Lean Cuisine  Atkins Meals  Smart Ones    Follow-Up:   Recommend follow up visit in 1 month while continuing to work on weight loss to goal weight.    Time spent with patient: 30 minutes.  Mabel Agrawal, MS, RD, LD

## 2018-09-20 ENCOUNTER — OFFICE VISIT (OUTPATIENT)
Dept: SLEEP MEDICINE | Facility: CLINIC | Age: 38
End: 2018-09-20
Payer: COMMERCIAL

## 2018-09-20 VITALS
SYSTOLIC BLOOD PRESSURE: 131 MMHG | HEART RATE: 89 BPM | RESPIRATION RATE: 16 BRPM | WEIGHT: 293 LBS | HEIGHT: 64 IN | DIASTOLIC BLOOD PRESSURE: 87 MMHG | BODY MASS INDEX: 50.02 KG/M2 | OXYGEN SATURATION: 98 %

## 2018-09-20 DIAGNOSIS — E66.01 MORBID OBESITY WITH BMI OF 60.0-69.9, ADULT (H): ICD-10-CM

## 2018-09-20 DIAGNOSIS — G47.9 SLEEP DISTURBANCE: Primary | ICD-10-CM

## 2018-09-20 DIAGNOSIS — Z72.821 POOR SLEEP HYGIENE: ICD-10-CM

## 2018-09-20 DIAGNOSIS — J35.1 ENLARGED TONSILS: ICD-10-CM

## 2018-09-20 PROCEDURE — 99204 OFFICE O/P NEW MOD 45 MIN: CPT | Performed by: INTERNAL MEDICINE

## 2018-09-20 RX ORDER — ZOLPIDEM TARTRATE 5 MG/1
TABLET ORAL
Qty: 1 TABLET | Refills: 0 | Status: SHIPPED | OUTPATIENT
Start: 2018-09-20 | End: 2023-12-19

## 2018-09-20 NOTE — NURSING NOTE
"Chief Complaint   Patient presents with     Consult     Snores per family,  No SS or cpap       Initial /87  Pulse 89  Resp 16  Ht 1.613 m (5' 3.5\")  Wt (!) 162.4 kg (358 lb)  SpO2 98%  BMI 62.42 kg/m2 Estimated body mass index is 62.42 kg/(m^2) as calculated from the following:    Height as of this encounter: 1.613 m (5' 3.5\").    Weight as of this encounter: 162.4 kg (358 lb).    Medication Reconciliation: complete    Neck circumference: 16 inches / 41 centimeters.  Ess 1    DME: JOSEPH Flynn LPN/VIOLETTE  "

## 2018-09-20 NOTE — MR AVS SNAPSHOT
"              After Visit Summary   9/20/2018    Svetlana Perry    MRN: [id number]           Patient Information     Date Of Birth          1980        Visit Information        Provider Department      9/20/2018 2:00 PM Jensen Dallas MD [facility] [place]        Today's Diagnoses     Sleep disturbance    -  1    Morbid obesity with BMI of 60.0-69.9, adult (H)          Care Instructions    MY TREATMENT INFORMATION FOR SLEEP DISTURBANCE-  Svetlana Robe    DOCTOR : Jensen Dallas MD  SLEEP CENTER :  [place]    MY CONTACT NUMBER:[phone]        If I haven't had a sleep study yet, what can I expect?  A personal story from [employer]  https://www.docTrackr.com/watch?v=AxPLmlRpnCs    Suspected sleep apnea: Sleep study ordered.    Follow up in sleep clinic 1-2 weeks after sleep study to discuss results of sleep study and treatment options.    Patient was advised not to drive if drowsy or sleepy.    Frequently asked questions:  1. What is Obstructive Sleep Apnea (PAN)? PAN is the most common type of sleep apnea. Apnea literally means, \"without breath.\" It is characterized by repetitive pauses in breathing, despite continued effort to breathe, and is usually associated with a reduction in blood oxygen saturation. Apneas can last 10 to over 60 seconds. It is caused by narrowing or collapse of the upper airway as muscles relax during sleep. Severity of sleep apnea is determined by frequency of breathing events and their effect on your sleep and oxygen levels determined during sleep testing.   2. What are the consequences of PAN? Symptoms include: daytime sleepiness- possibly increasing the risk of falling asleep while driving, unrefreshing/restless sleep, snoring, insomnia, waking frequently to urinate, waking with heartburn or reflux, reduced concentration and memory, and morning headaches. Other health consequences may include development of high blood pressure and other cardiovascular " disease in persons who are susceptible. Untreated PAN  can contribute to heart disease, stroke and diabetes.   3. What are the treatment options? In most situations, sleep apnea is a lifelong disease that must be managed with daily therapy. Medications are not effective for sleep apnea and surgery is generally not performed until other therapies have been tried. Therapy is usually tailored to the individual patient based on many factors including your wishes as well as severity of sleep apnea and severity of obesity. Continuous Positive Airway (CPAP) is the most reliable treatment. An oral device to hold your jaw forward is usually the next most reliable option. Other options include postioning devices (to keep you off your back), weight loss, and surgery including a tongue pacing device. There is more detail about some of these options below.    Central sleep apnea is a disorder in which your breathing repeatedly stops and starts during sleep.  Central sleep apnea occurs because your brain doesn't send proper signals to the muscles that control your breathing. This condition is different from obstructive sleep apnea, in which you can't breathe normally because of upper airway obstruction. Central sleep apnea is less common than obstructive sleep apnea.  Central sleep apnea may occur as a result of other conditions, such as heart failure and stroke. Sleeping at a high altitude and pain medications also may cause central sleep apnea.        1. CPAP-  WHAT DOES IT DO AND HOW CAN I LEARN TO WEAR IT?                               BEFORE I START, CAN I WATCH A MOVIE TO GET A PLAN ON HOW TO USE CPAP?  https://www.Conductor.com/watch?l=r1L70kw340G      Continuous positive airway pressure, or CPAP, is the most effective treatment for obstructive sleep apnea. It works by blowing room air, through a mask, to hold your throat open. A decision to use CPAP is a major step forward in the pursuit of a healthier life. The successful  "use of CPAP will help you breathe easier, sleep better and live healthier. You can choose CPAP equipment from any durable medical equipment provider that meets your needs.  Using CPAP can be a positive experience if you keep these marcelo points in mind:  1. Commitment  CPAP is not a quick fix for your problem. It involves a long-term commitment to improve your sleep and your health.    2. Communication  Stay in close communication with both your sleep doctor and your CPAP supplier. Ask lots of questions and seek help when you need it.    3. Consistency  Use CPAP all night, every night and for every nap. You will receive the maximum health benefits from CPAP when you use it every time that you sleep. This will also make it easier for your body to adjust to the treatment.    4. Correction  The first machine and mask that you try may not be the best ones for you. Work with your sleep doctor and your CPAP supplier to make corrections to your equipment selection. Ask about trying a different type of machine or mask if you have ongoing problems. Make sure that your mask is a good fit and learn to use your equipment properly.    5. Challenge  Tell a family member or close friend to ask you each morning if you used your CPAP the previous night. Have someone to challenge you to give it your best effort.    6. Connection   Your adjustment to CPAP will be easier if you are able to connect with others who use the same treatment. Ask your sleep doctor if there is a support group in your area for people who have sleep apnea, or look for one on the Internet.  7. Comfort   Increase your level of comfort by using a saline spray, decongestant or heated humidifier if CPAP irritates your nose, mouth or throat. Use your unit's \"ramp\" setting to slowly get used to the air pressure level. There may be soft pads you can buy that will fit over your mask straps. Look on www.CPAP.com for accessories that can help make CPAP use more " comfortable.  8. Cleaning   Clean your mask, tubing and headgear on a regular basis. Put this time in your schedule so that you don't forget to do it. Check and replace the filters for your CPAP unit and humidifier.    9. Completion   Although you are never finished with CPAP therapy, you should reward yourself by celebrating the completion of your first month of treatment. Expect this first month to be your hardest period of adjustment. It will involve some trial and error as you find the machine, mask and pressure settings that are right for you.    10. Continuation  After your first month of treatment, continue to make a daily commitment to use your CPAP all night, every night and for every nap.    CPAP-Tips to starting with success:  Begin using your CPAP for short periods of time during the day while you watch TV or read.    Use CPAP every night and for every nap. Using it less often reduces the health benefits and makes it harder for your body to get used to it.    Make small adjustments to your mask, tubing, straps and headgear until you get the right fit. Tightening the mask may actually worsen the leak.  If it leaves significant marks on your face or irritates the bridge of your nose, it may not be the best mask for you.  Speak with the person who supplied the mask and consider trying other masks. Insurances will allow you to try different masks during the first month of starting CPAP.  Insurance also covers a new mask, hose and filter about every 6 months.    Use a saline nasal spray to ease mild nasal congestion. Neti-Pot or saline nasal rinses may also help. Nasal gel sprays can help reduce nasal dryness.  Biotene mouthwash can be helpful to protect your teeth if you experience frequent dry mouth.  Dry mouth may be a sign of air escaping out of your mouth or out of the mask in the case of a full face mask.  Speak with your provider if you expect that is the case.     Take a nasal decongestant to relieve  more severe nasal or sinus congestion.  Do not use Afrin (oxymetazoline) nasal spray more than 3 days in a row.  Speak with your sleep doctor if your nasal congestion is chronic.    Use a heated humidifier that fits your CPAP model to enhance your breathing comfort. Adjust the heat setting up if you get a dry nose or throat, down if you get condensation in the hose or mask.  Position the CPAP lower than you so that any condensation in the hose drains back into the machine rather than towards the mask.    Try a system that uses nasal pillows if traditional masks give you problems.    Clean your mask, tubing and headgear once a week. Make sure the equipment dries fully.    Regularly check and replace the filters for your CPAP unit and humidifier.    Work closely with your sleep provider and your CPAP supplier to make sure that you have the machine, mask and air pressure setting that works best for you. It is better to stop using it and call your provider to solve problems than to lay awake all night frustrated with the device.    BESIDES CPAP, WHAT OTHER THERAPIES ARE THERE?      Positioning Device  Positioning devices are generally used when sleep apnea is mild and only occurs on your back.This example shows a pillow that straps around the waist. It may be appropriate for those whose sleep study shows milder sleep apnea that occurs primarily when lying flat on one's back. Preliminary studies have shown benefit but effectiveness at home may need to be verified by a home sleep test. These devices are generally not covered by medical insurance.                      Oral Appliance  What is oral appliance therapy?  An oral appliance is a small acrylic device that fits over the upper and lower teeth or tongue (similar to an orthodontic retainer or a mouth guard). This device slightly advances the lower jaw or tongue, which moves the base of the tongue forward, opens the airway, improves breathing and can effectively treat  snoring and obstructive sleep apnea sleep apnea. The appliance is fabricated and customized by a qualified dentist with experience in treating snoring and sleep apnea. Oral appliances are usually well tolerated and have relatively high compliance by patients1, 2, 3.  When is an oral appliance indicated?  Oral appliance therapy is recommended as a first-line treatment for patients with primary snoring, mild sleep apnea, and for patients with moderate sleep apnea who prefer appliance therapy to use of CPAP4, 5. Severity of sleep apnea is determined by sleep testing and is based on the number of respiratory events per hour of sleep.   How successful is oral appliance therapy?  The success rate of oral appliance therapy in patients with mild sleep apnea is 75-80% while in patients with moderate sleep apnea it is 50-70%. The chance of success in patients with severe sleep apnea is 40-50%. The research also shows that oral appliances have a beneficial effect on the cardiovascular health of PAN patients at the same magnitude as CPAP therapy7.  Oral appliances should be a second-line treatment in cases of severe sleep apnea, but if not completely successful then a combination therapy utilizing CPAP plus oral appliance therapy may be effective. Oral appliances tend to be effective in a broad range of patients although studies show that the patients who have the highest success are females, younger patients, those with milder disease, and less severe obesity. 3, 6.   The chances of success are lower in patients who have more severe PAN, are older, and those who are morbidly obese.     Example of an oral appliance   Finding a dentist that practices dental sleep medicine  Specific training is available through the American Academy of Dental Sleep Medicine for dentists interested in working in the field of sleep. To find a dentist who is educated in the field of sleep and the use of oral appliances, near you, visit the Web site  of the American Academy of Dental Sleep Medicine; also see   http://www.accpstorage.org/newOrganization/patients/oralAppliances.pdf  To search for a dentist certified in these practices:  Http://aadsm.org/FindADentist.aspx?1  1. Ricardo et al. Objectively measured vs self-reported compliance during oral appliance therapy for sleep-disordered breathing. Chest 2013; 144(5): 7119-7147.  2. Marcie et al. Objective measurement of compliance during oral appliance therapy for sleep-disordered breathing. Thorax 2013; 68(1): 91-96.  3. Mariela, et al. Mandibular advancement devices in 620 men and women with PAN and snoring: tolerability and predictors of treatment success. Chest 2004; 125: 9558-5519.  4. David et al. Oral appliances for snoring and PAN: a review. Sleep 2006; 29: 244-262.  5. Kenia et al. Oral appliance treatment for PAN: an update. J Clin Sleep Med 2014; 10(2): 215-227.  6. Carolyn et al. Predictors of OSAH treatment outcome. J Dent Res 2007; 86: 9745-9869.    Nasal Valves                 Nasal valves may not be effective if you have frequent nasal congestion or have difficulty breathing through your nose. They may be an option for mild apnea if other options are not well tolerated. The efficacy of these devices is generally less than CPAP or oral appliances.      Weight Loss:    Weight management is a personal decision.  If you are interested in exploring weight loss strategies, the following discussion covers the impact on weight loss on sleep apnea and the approaches that may be successful.    Weight loss decreases severity of sleep apnea in most people with obesity. For those with mild obesity who have developed snoring with weight gain, even 15-30 pound weight loss can improve and occasionally eliminate sleep apnea.  Structured and life-long dietary and health habits are necessary to lose weight and keep healthier weight levels.     Though there may be significant health benefits  from weight loss, long-term weight loss is very difficult to achieve- studies show success with dietary management in less than 10% of people. In addition, substantial weight loss may require years of dietary control and may be difficult if patients have severe obesity. In these cases, surgical management may be considered.  Finally, older individuals who have tolerated obesity without health complications may be less likely to benefit from weight loss strategies.    Your BMI is Body mass index is 62.42 kg/(m^2).  Body mass index (BMI) is one way to tell whether you are at a healthy weight, overweight, or obese. It measures your weight in relation to your height.  A BMI of 18.5 to 24.9 is in the healthy range. A person with a BMI of 25 to 29.9 is considered overweight, and someone with a BMI of 30 or greater is considered obese. More than two-thirds of American adults are considered overweight or obese.  Being overweight or obese increases the risk for further weight gain. Excess weight may lead to heart disease and diabetes.  Creating and following plans for healthy eating and physical activity may help you improve your health.  Weight control is part of healthy lifestyle and includes exercise, emotional health, and healthy eating habits. Careful eating habits lifelong are the mainstay of weight control. Though there are significant health benefits from weight loss, long-term weight loss with diet alone may be very difficult to achieve- studies show long-term success with dietary management in less than 10% of people. Attaining a healthy weight may be especially difficult to achieve in those with severe obesity. In some cases, medications, devices and surgical management might be considered.  What can you do?  If you are overweight or obese and are interested in methods for weight loss, you should discuss this with your provider.     Consider reducing daily calorie intake by 500 calories.     Keep a food journal.      Avoiding skipping meals, consider cutting portions instead.    Diet combined with exercise helps maintain muscle while optimizing fat loss. Strength training is particularly important for building and maintaining muscle mass. Exercise helps reduce stress, increase energy, and improves fitness. Increasing exercise without diet control, however, may not burn enough calories to loose weight.       Start walking three days a week 10-20 minutes at a time    Work towards walking thirty minutes five days a week     Eventually, increase the speed of your walking for 1-2 minutes at time    In addition, we recommend that you review healthy lifestyles and methods for weight loss available through the National Institutes of Health patient information sites:  http://win.niddk.nih.gov/publications/index.htm    And look into health and wellness programs that may be available through your health insurance provider, employer, local community center, or domitila club.    Weight management plan: Patient was referred to their PCP to discuss a diet and exercise plan.    Surgery:    Upper Airway Surgery for PAN  Surgery for PAN is a second-line treatment option in the management of sleep apnea.  Surgery should be considered for patients who are having a difficult time tolerating CPAP.    Surgery for PAN is directed at areas that are responsible for narrowing or complete obstruction of the airway during sleep.  There are a wide range of procedures available to enlarge and/or stabilize the airway to prevent blockage of breathing in the three major areas where it can occur: the palate, tongue, and nasal regions.  Successful surgical treatment depends on the accurate identification of the factors responsible for obstructive sleep apnea in each person.  A personalized approach is required because there is no single treatment that works well for everyone.  Because of anatomic variation, consultation with an examination by a sleep surgeon is a  critical first step in determining what surgical options are best for each patient.  In some cases, examination during sedation may be recommended in order to guide the selection of procedures.  Patients will be counseled about risks and benefits as well as the typical recovery course after surgery. Surgery is typically not a cure for a person s PAN.  However, surgery will often significantly improve one s PAN severity (termed  success rate ).  Even in the absence of a cure, surgery will decrease the cardiovascular risk associated with OSA7; improve overall quality of life8 (sleepiness, functionality, sleep quality, etc).          Palate Procedures:  Patients with PAN often have narrowing of their airway in the region of their tonsils and uvula.  The goals of palate procedures are to widen the airway in this region as well as to help the tissues resist collapse.  Modern palate procedure techniques focus on tissue conservation and soft tissue rearrangement, rather than tissue removal.  Often the uvula is preserved in this procedure. Residual sleep apnea is common in patient after pharyngoplasty with an average reduction in sleep apnea events of 33%2.      Tongue Procedures:  While patients are awake, the muscles that surround the throat are active and keep this region open for breathing. These muscles relax during sleep, allowing the tongue and other structures to collapse and block breathing.  There are several different tongue procedures available.  Selection of a tongue base procedure depends on characteristics seen on physical exam.  Generally, procedures are aimed at removing bulky tissues in this area or preventing the back of the tongue from falling back during sleep.  Success rates for tongue surgery range from 50-62%3.    Hypoglossal Nerve Stimulation:  Hypoglossal nerve stimulation has recently received approval from the [place] Food and Drug Administration for the treatment of obstructive sleep  apnea.  This is based on research showing that the system was safe and effective in treating sleep apnea6.  Results showed that the median AHI score decreased 68%, from 29.3 to 9.0. This therapy uses an implant system that senses breathing patterns and delivers mild stimulation to airway muscles, which keeps the airway open during sleep.  The system consists of three fully implanted components: a small generator (similar in size to a pacemaker), a breathing sensor, and a stimulation lead.  Using a small handheld remote, a patient turns the therapy on before bed and off upon awakening.    Candidates for this device must be greater than 22 years of age, have moderate to severe PAN (AHI between 20-65), BMI less than 32, have tried CPAP/oral appliance without success, and have appropriate upper airway anatomy (determined by a sleep endoscopy performed by Dr. Power).    Hypoglossal Nerve Stimulation Pathway:    The sleep surgeon s office will work with the patient through the insurance prior-authorization process (including communications and appeals).    Nasal Procedures:  Nasal obstruction can interfere with nasal breathing during the day and night.  Studies have shown that relief of nasal obstruction can improve the ability of some patients to tolerate positive airway pressure therapy for obstructive sleep apnea1.  Treatment options include medications such as nasal saline, topical corticosteroid and antihistamine sprays, and oral medications such as antihistamines or decongestants. Non-surgical treatments can include external nasal dilators for selected patients. If these are not successful by themselves, surgery can improve the nasal airway either alone or in combination with these other options.      Combination Procedures:  Combination of surgical procedures and other treatments may be recommended, particularly if patients have more than one area of narrowing or persistent positional disease.  The success rate of  combination surgery ranges from 66-80%2,3.      1. Eveline GILLIS. The Role of the Nose in Snoring and Obstructive Sleep Apnoea: An Update.  Eur Arch Otorhinolaryngol. 2011; 268: 1365-73.  2.  Duran SM; Trent JA; Hunter JR; Pallanch JF; Lindsay MB; Carola SG; Jj LING. Surgical modifications of the upper airway for obstructive sleep apnea in adults: a systematic review and meta-analysis. SLEEP 2010;33(10):7867-7101. Raphael STEVENS. Hypopharyngeal surgery in obstructive sleep apnea: an evidence-based medicine review.  Arch Otolaryngol Head Neck Surg. 2006 Feb;132(2):206-13.  3. Nicolás YH1, Vinnie Y, Jaylon JAMILA. The efficacy of anatomically based multilevel surgery for obstructive sleep apnea. Otolaryngol Head Neck Surg. 2003 Oct;129(4):327-35.  4. Raphael STEVENS, Goldberg A. Hypopharyngeal Surgery in Obstructive Sleep Apnea: An Evidence-Based Medicine Review. Arch Otolaryngol Head Neck Surg. 2006 Feb;132(2):206-13.  5. Pritio PJ et al. Upper-Airway Stimulation for Obstructive Sleep Apnea.  N Engl J Med. 2014 Jan 9;370(2):139-49.  6. Colin Y et al. Increased Incidence of Cardiovascular Disease in Middle-aged Men with Obstructive Sleep Apnea. Am J Respir Crit Care Med; 2002 166: 159-165  7. Marin EM et al. Studying Life Effects and Effectiveness of Palatopharyngoplasty (SLEEP) study: Subjective Outcomes of Isolated Uvulopalatopharyngoplasty. Otolaryngol Head Neck Surg. 2011; 144: 623-631.  8.   Your blood pressure was checked while you were in clinic today.  Please read the guidelines below about what these numbers mean and what you should do about them.  Your systolic blood pressure is the top number.  This is the pressure when the heart is pumping.  Your diastolic blood pressure is the bottom number.  This is the pressure in between beats.  If your systolic blood pressure is less than 120 and your diastolic blood pressure is less than 80, then your blood pressure is normal. There is nothing more that you need to do about it  If  your systolic blood pressure is 120-139 or your diastolic blood pressure is 80-89, your blood pressure may be higher than it should be.  You should have your blood pressure re-checked within a year by a primary care provider.  If your systolic blood pressure is 140 or greater or your diastolic blood pressure is 90 or greater, you may have high blood pressure.  High blood pressure is treatable, but if left untreated over time it can put you at risk for heart attack, stroke, or kidney failure.  You should have your blood pressure re-checked by a primary care provider within the next four weeks.      Good Sleep hygiene tips (American Academy of Sleep Medicine):  Maintain a regular sleep-wake routine    Go to bed at the same time. Wake up at the same time. Ideally, your schedule will remain the same (+/- 20 minutes) every night of the week.  Avoid naps if possible    Naps decrease the  Sleep Debt  that is so necessary for easy sleep onset.    Each of us needs a certain amount of sleep per 24-hour period. We need that amount, and we don t need more than that.    When we take naps, it decreases the amount of sleep that we need the next night - which may cause sleep fragmentation and difficulty initiating sleep, and may lead to insomnia.  Don t stay in bed awake for more than 15-20 minutes (Stimulus control)    If you find your mind racing, or worrying about not being able to sleep during the middle of the night, get out of bed, and sit in a chair in the dark. Do your mind racing in the chair until you are sleepy, then return to bed. No TV, or phone/Ipad, or computer or internet or eat during these periods! That will just stimulate you more than desired.    If this happens several times during the night, that is OK. Just maintain your regular wake time, and try to avoid naps.  Don t watch TV, phone, computer, video games or read in bed or eat in bed.    When you watch TV or read in bed or eat in bed, you associate the bed  with wakefulness.    The bed is reserved for two things - sleep and hanky panky.  Drink caffeinated drinks with caution    The effects of caffeine may last for several hours after ingestion. Caffeine can fragment sleep, and cause difficulty initiating sleep. If you drink caffeine, use it only before noon.    Remember that soda and tea contain caffeine as well.  Avoid inappropriate substances that interfere with sleep    Cigarettes, alcohol, and over-the-counter medications may cause fragmented sleep.  Exercise regularly    Exercise promotes continuous sleep.    Rigorous exercise (close to bedtime cause) circulates endorphins into the body which may cause difficulty initiating sleep.   Have a quiet, comfortable bedroom    Set your bedroom thermostat at a comfortable temperature. Generally, a little cooler is better than a little warmer.    Turn off the TV and other extraneous noise that may disrupt sleep. Background  white noise  like a fan is OK.    If your pets awaken you, keep them outside the bedroom.    Your bedroom should be dark. Turn off bright lights.    Have a comfortable mattress.  If you are a  clock watcher  at night, hide the clock.      Have a comfortable pre-bedtime routine    A warm bath, shower    Meditation, or quiet time    Wind-down 20 minutes prior of bedtime.              Follow-ups after your visit        Future tests that were ordered for you today     Open Future Orders        Priority Expected Expires Ordered    Comprehensive Sleep Study Routine  3/19/2019 9/20/2018            Who to contact     If you have questions or need follow up information about today's clinic visit or your schedule please contact [facility] directly at [phone].  Normal or non-critical lab and imaging results will be communicated to you by MyChart, letter or phone within 4 business days after the clinic has received the results. If you do not hear from us within 7 days, please contact  "the clinic through [email or web address] or phone. If you have a critical or abnormal lab result, we will notify you by phone as soon as possible.  Submit refill requests through [email or web address] or call your pharmacy and they will forward the refill request to us. Please allow 3 business days for your refill to be completed.          Additional Information About Your Visit        [employer] Information     [email or web address] gives you secure access to your electronic health record. If you see a primary care provider, you can also send messages to your care team and make appointments. If you have questions, please call your primary care clinic.  If you do not have a primary care provider, please call [phone] and they will assist you.        Care EveryWhere ID     This is your Care EveryWhere ID. This could be used by other organizations to access your [facility] medical records  [id number]        Your Vitals Were     Pulse Respirations Height Pulse Oximetry BMI (Body Mass Index)       89 16 1.613 m (5' 3.5\") 98% 62.42 kg/m2        Blood Pressure from Last 3 Encounters:   09/20/18 131/87   08/24/18 127/90   06/06/18 (!) 148/96    Weight from Last 3 Encounters:   09/20/18 (!) 162.4 kg (358 lb)   09/13/18 (!) 162.6 kg (358 lb 8 oz)   08/24/18 (!) 164.7 kg (363 lb)                 Today's Medication Changes          These changes are accurate as of 9/20/18  2:38 PM.  If you have any questions, ask your nurse or doctor.               Start taking these medicines.        Dose/Directions    zolpidem 5 MG tablet   Commonly known as:  AMBIEN   Used for:  Sleep disturbance   Started by:  Jensen Dallas MD        Take tablet by mouth 15 minutes prior to sleep, for Sleep Study   Quantity:  1 tablet   Refills:  0            Where to get your medicines      Some of these will need a paper prescription and others can be bought over the counter.  Ask your nurse if you have questions.     Bring a paper prescription for each of these medications     zolpidem 5 " MG tablet                Primary Care Provider Office Phone # Fax #    Edel Herzog [phone] [address]  [facility] [address]        Equal Access to Services     ENOCH MEDRANO : Hadii sherry ku hadvioletao Soomaali, waaxda luqadaha, qaybta kaalmada adeegyada, sigrid herron laLatishaalex dow. So [facility] [phone].    ATENCIÓN: Si habla español, tiene a ghosh disposición servicios gratuitos de asistencia lingüística. [facility] [phone].    We comply with applicable federal civil rights laws and [place] laws. We do not discriminate on the basis of race, color, national origin, age, disability, sex, sexual orientation, or gender identity.            Thank you!     Thank you for choosing [facility]  for your care. Our goal is always to provide you with excellent care. Hearing back from our patients is one way we can continue to improve our services. Please take a few minutes to complete the written survey that you may receive in the mail after your visit with us. Thank you!             Your Updated Medication List - Protect others around you: Learn how to safely use, store and throw away your medicines at www.disposemymeds.org.          This list is accurate as of 9/20/18  2:38 PM.  Always use your most recent med list.                   Brand Name Dispense Instructions for use Diagnosis    buPROPion 75 MG tablet    WELLBUTRIN    60 tablet    Take 1 tablet (75 mg) by mouth 2 times daily    Depression, Anxiety       vitamin D 2000 units tablet     100 tablet    Take 2,000 Units by mouth daily    Vitamin D deficiency       zolpidem 5 MG tablet    AMBIEN    1 tablet    Take tablet by mouth 15 minutes prior to sleep, for Sleep Study    Sleep disturbance

## 2018-09-20 NOTE — PROGRESS NOTES
[facility]  Outpatient Sleep Medicine Consultation  September 20, 2018      Name: Svetlana Perry MRN# [id number]   Age: 37 year old YOB: 1980     Date of Consultation: September 20, 2018  Consultation is requested by: Alice Mccain PA-C  [address]  [place] [address]  Primary care provider: Edel Herzog  [place] clinic: [facility]       Reason for Sleep Consult:     Svetlana Perry is a 37 year old female nightly snoring, poor quality of sleep and daytime fatigue and pre-op evaluation for sleep apnea for bariatric surgery.         Assessment and Plan:     Summary Sleep Diagnoses/Recommendations:    1. Sleep Disturbance:  High suspicion of sleep disordered breathing based on patient's symptoms (snoring, excessive daytime sleepiness, witnessed apneas), high BMI, neck circumference and oropharyngeal examination in setting of pre-op evaluation for sleep apnea for bariatric surgery, morbid obesity and suspected OHS. Will schedule PSG with PAP titration in second half if patient meets criteria for PAN in first half of PSG with TCM CO2. We also discussed the pathophysiology of sleep disordered breathing and the importance of treating it if S/he should have it. Patient is advised not to drive if he/she feels drowsy or sleepy.  Follow up after sleep study to discuss the result of sleep study and treatment options.    2. Morbid Obesity:  In process of pre-op evaluation for sleep apnea for bariatric surgeryCounseled regarding weight loss through diet modification and increased physical activity. Patient was given instuctions of weight loss and advised to follow up her PCP and bariatric team for further weight loss interventions.    3.  Poor sleep hygiene:   - We instructed patient to develop regular sleep-wake schedule and regular sleep habits including regular bedtime and wake time to strengthen circadian rhythm, to sleep as much as needed to  feel refreshed, not to spend more time in bed than needed. Bedroom should be dark, cool and quiet.  - We also asked patient to avoid napping during the day, forcing you to sleep, taking problems to bed, strenuous activity just before bedtime, use of caffeine, alcohol or tobacco just before bedtime, reading, eating or watching TV in bed.  Good sleep hygiene and sleep-wake instructions were given.    4. Enlarged tonsils, monitor. Consider ENT referral in future.      Orders Placed This Encounter   Procedures     Comprehensive Sleep Study    Ambien 5 mgx1    Summary Counseling:  See instructions    Counseling included a comprehensive review of diagnostic and therapeutic strategies as well as risks of inadequate therapy.  Educational materials provided in instructions.    All questions were answered.  The patient indicates understanding of the above issues and agrees with the plan set forth.           History of Present Illness:     Svetlana Perry is a 37 year old female with history of morbid obesity, depression, anxiety (in remission), GERD and kidney stone who presents to the [facility] in [place] with complains of sleep disturbance. I was asked to see Ms. Perry regarding pre-op evaluation for sleep apnea for bariatric surgery by Alice Mccain PA-C.  Patient is in process of pre-op evaluation for bariatric surgery.  Patient complaints loud snoring as per spouse, daytime fatigue and dry mouth and throat. She denies witnessed apnea, waking up gasping air/choking, excessive daytime sleepiness, morning headache and acid reflux. She has restless legs and grinding her teeth. She denies difficulty falling and staying asleep. She wakes up 1-2 times a night for bathroom or unclear reason or noises. He watches tablet, phone, work and read in bed. She has gained  pounds in 10 years.    Please see below for sleep ROS details.    PREVIOUS IN- LAB or HOME SLEEP STUDIES:  None     SLEEP-WAKE  SCHEDULE:     Svetlana Perry     -Describes themself as neither a morning or night person;      -ON WEEKDAYS, goes to sleep at 9:30 PM during the week; awakens  6:30 AM with an alarm; falls asleep in 30-45 minutes; denies difficulty falling asleep.     -ON WEEKENDS, goes to sleep at 11:00 PM-1:00 AM and wakes up at 6:30-9:00 AM without an alarm; falls asleep in 30-45 minutes.       -Awakens 1-2 times a night for 10-15 minutes before falling back to sleep; awakens to go to the bathroom, uncertain reasons and external stimuli.      -Total sleep time: 6-8 hours per night.    -Naps 0 times/days per week       BEDTIME ACTIVITIES AND SHIFT WORK:    Svetlana Perry    -does use electronics in bed, read in bed and work in bed and does not watch TV in bed and eat in bed.     -does not do shift work.  He/she works day shifts.      Occupation:      [employer]       SLEEP APNEA: [employer] score: 4       INSOMNIA:  Insomnia severity score: 4       SLEEPINESS: [place] sleepiness scale (ESS): 1   Drowsy driving/near accidents: No          PHQ9: N/A    SLEEP COMPLAINTS:   Snoring- 3-4 days/week  Witness apnea: No  Gasping/Choking: No  Excessive daytime sleep: No  Toss/turn: Yes/No  Excessive tiredness/fatigue:  Yes  Morning headaches: No/Yes  Dry mouth/throat: Yes  Dyspnea: Yes  Coexisting Lung disease: No    Coexisting Heart disease: No    Does patient have a bed partner: Yes  Has bed partner been sleeping separately because of snoring:  No            RLS Screen: When you try to relax in the evening or sleep at  night, do you ever have unpleasant, restless feelings in your  legs that can be relieved by walking or movement? Yes    Periodic limb movement: No    Narcolepsy:       denies sudden urges of sleep attacks     denies cataplexy     denies sleep paralysis      denies hallucinations     Sleep Behaviors:     denies leg symptoms/movements     denies motor restlessness     denies night terrors     yes bruxism,  not using mouth guard     denies automatic behaviors    Other subjective complaints:     denies anxiety or rumination      denies pain and discomfort at  night     denies waking up with heart pounding or racing     denies GERD/heartburn         Parasomnia:   NREM - denies recurrent persistent confusional arousal, night eating, sleep walking or sleep terrors   REM  - denies dream enactment; no injuries. Occasional vivid dreams.    Safety: None             Medications:     Current Outpatient Prescriptions   Medication Sig     buPROPion (WELLBUTRIN) 75 MG tablet Take 1 tablet (75 mg) by mouth 2 times daily     Cholecalciferol (VITAMIN D) 2000 units tablet Take 2,000 Units by mouth daily     No current facility-administered medications for this visit.         Medication that can affect sleep: None    Allergies   Allergen Reactions     Seasonal Allergies      Vicodin [Hydrocodone-Acetaminophen] Nausea and Vomiting            Past Medical History:     Does not need 02 supplement at night     Past Medical History:   Diagnosis Date     Anxiety      Depression      Esophageal reflux 2007    After child birth     Hearing problem 1991    No longer require hearing aid in right ear     Kidney stone 2009    Kidney stones removed     Reflux                Past Surgical History:    No previous upper airway surgery     Past Surgical History:   Procedure Laterality Date     COLOSTOMY  [birth year]    as an infant for ?etiology-closed at 8 months of age without problems since     GALLSTONE REMOVAL[       MAMMOPLASTY REDUCTION BILATERAL  2/7/2012    Procedure:MAMMOPLASTY REDUCTION BILATERAL; BILATERAL REDUCTION MAMMOPLASTY; Surgeon:JAROCHO WEISS; Location:[facility]            Social History:     Social History   Substance Use Topics     Smoking status: Never Smoker     Smokeless tobacco: Never Used     Alcohol use No         Chemical History:     Tobacco: No     Uses 1 cups/day of tea. Last caffeine intake is usually before 8 am    EtOH: No  "  Recreational Drugs: No    Psych Hx:    Depression and anxiety in remission    Current dangers to self or others: None           Family History:     Family History   Problem Relation Age of Onset     Diabetes Maternal Grandmother      Depression Mother      Obesity Mother      Obesity Sister         Sleep Family Hx:        RLS- No  PAN - No  Insomnia - No  Parasomnia - No         Review of Systems:     A complete 10 point review of systems was negative other than HPI or as commented below:   Patient denies chest pain,  wheezing, abdominal pain, n&v, fever, chills, dysuria, leg pain. Patient is also denies ear pain, sore throat, postnasal drip, running nose, dry cough.  Patient has weight gain, leg swelling, dyspnea with activity, irregular menses.    Svetlana Perry has gained  pounds in 10 years.            Physical Examination:   /87  Pulse 89  Resp 16  Ht 1.613 m (5' 3.5\")  Wt (!) 162.4 kg (358 lb)  SpO2 98%  BMI 62.42 kg/m2     Neck Circumference:41 cm   Constitutional: . Awake, alert, cooperative, in no apparent distress  Mood: euthymic; affect congruent with full range and intensity.  Attention/Concentration:  Normal   Eyes: Pupils round and reactive. No icterus.  ENT: Mallampati Class: IV.   Tonsillar Stage: 3  extending beyond pillars  Clear nasal passages. Enlarged inferior turbinates. No deviated septum.  Oropharynx: No high arched palate. No pharyngeal erythema or exudates, micrognathia, small and crowded oropharynx,  l, elongated uvula.  lateral narrowing  Tongue: relative macroglossia   Dentition: Good. overjet.  Dentures: None  Neck: Supple, no thyroid enlargement.   Cardiovascular: Regular S1 and S2, no murmurs or gallops.    Pulmonary:  Chest symmetric, lungs reveal decreased breath sounds at bases. No rales or wheezes.  Abdomen: Soft, obese, non tender.  Extremities:  Non pedal edema.  Muscle/joint: Strength and tone normal   Skin:  No rash or significant lesions examined areas " of skin.   Neurologic: Alert, oriented x3, no focal neurological deficit.           Data: All pertinent previous laboratory data reviewed     No results found for: PH, PHARTERIAL, PO2, [id number], SAT, PCO2, HCO3, BASEEXCESS, HUNTER, BEB  Lab Results   Component Value Date    TSH 1.90 09/18/2006     Lab Results   Component Value Date    GLC 96 08/24/2018    GLC 90 06/06/2018     Lab Results   Component Value Date    HGB 11.4 (L) 08/24/2018    HGB 12.0 06/06/2018     Lab Results   Component Value Date    BUN 8 08/24/2018    BUN 10 06/06/2018    CR 0.65 08/24/2018    CR 0.72 06/06/2018     Lab Results   Component Value Date    CO2 27 08/24/2018    CO2 27 06/06/2018     No results found for: GISELLE      Echocardiography: No    Chest x-ray: No    PFT: No        Copy to: Edel Herzog  Copy to: Alice Dallas MD 9/20/2018   [facility] [address], [place] [address]   [phone] Clinic    Total time spent with patient: 41 minutes with this patient today in which 25 minutes was spent in counseling/coordination of care and going over planned testing and recommendations.

## 2018-09-20 NOTE — PATIENT INSTRUCTIONS
"MY TREATMENT INFORMATION FOR SLEEP DISTURBANCE-  Svetlana Perry    DOCTOR : Jensen Dallas MD  SLEEP CENTER :  [place]    MY CONTACT NUMBER:[phone]        If I haven't had a sleep study yet, what can I expect?  A personal story from [place]  https://www.GigsTime.com/watch?v=AxPLmlRpnCs    Suspected sleep apnea: Sleep study ordered.    Follow up in sleep clinic 1-2 weeks after sleep study to discuss results of sleep study and treatment options.    Patient was advised not to drive if drowsy or sleepy.    Frequently asked questions:  1. What is Obstructive Sleep Apnea (PAN)? PAN is the most common type of sleep apnea. Apnea literally means, \"without breath.\" It is characterized by repetitive pauses in breathing, despite continued effort to breathe, and is usually associated with a reduction in blood oxygen saturation. Apneas can last 10 to over 60 seconds. It is caused by narrowing or collapse of the upper airway as muscles relax during sleep. Severity of sleep apnea is determined by frequency of breathing events and their effect on your sleep and oxygen levels determined during sleep testing.   2. What are the consequences of PAN? Symptoms include: daytime sleepiness- possibly increasing the risk of falling asleep while driving, unrefreshing/restless sleep, snoring, insomnia, waking frequently to urinate, waking with heartburn or reflux, reduced concentration and memory, and morning headaches. Other health consequences may include development of high blood pressure and other cardiovascular disease in persons who are susceptible. Untreated PAN  can contribute to heart disease, stroke and diabetes.   3. What are the treatment options? In most situations, sleep apnea is a lifelong disease that must be managed with daily therapy. Medications are not effective for sleep apnea and surgery is generally not performed until other therapies have been tried. Therapy is usually tailored to the individual patient based " on many factors including your wishes as well as severity of sleep apnea and severity of obesity. Continuous Positive Airway (CPAP) is the most reliable treatment. An oral device to hold your jaw forward is usually the next most reliable option. Other options include postioning devices (to keep you off your back), weight loss, and surgery including a tongue pacing device. There is more detail about some of these options below.    Central sleep apnea is a disorder in which your breathing repeatedly stops and starts during sleep.  Central sleep apnea occurs because your brain doesn't send proper signals to the muscles that control your breathing. This condition is different from obstructive sleep apnea, in which you can't breathe normally because of upper airway obstruction. Central sleep apnea is less common than obstructive sleep apnea.  Central sleep apnea may occur as a result of other conditions, such as heart failure and stroke. Sleeping at a high altitude and pain medications also may cause central sleep apnea.        1. CPAP-  WHAT DOES IT DO AND HOW CAN I LEARN TO WEAR IT?                               BEFORE I START, CAN I WATCH A MOVIE TO GET A PLAN ON HOW TO USE CPAP?  https://www.De[employer] Ignition.com/watch?j=h1W99ah283D      Continuous positive airway pressure, or CPAP, is the most effective treatment for obstructive sleep apnea. It works by blowing room air, through a mask, to hold your throat open. A decision to use CPAP is a major step forward in the pursuit of a healthier life. The successful use of CPAP will help you breathe easier, sleep better and live healthier. You can choose CPAP equipment from any durable medical equipment provider that meets your needs.  Using CPAP can be a positive experience if you keep these marcelo points in mind:  1. Commitment  CPAP is not a quick fix for your problem. It involves a long-term commitment to improve your sleep and your health.    2. Communication  Stay in close  "communication with both your sleep doctor and your CPAP supplier. Ask lots of questions and seek help when you need it.    3. Consistency  Use CPAP all night, every night and for every nap. You will receive the maximum health benefits from CPAP when you use it every time that you sleep. This will also make it easier for your body to adjust to the treatment.    4. Correction  The first machine and mask that you try may not be the best ones for you. Work with your sleep doctor and your CPAP supplier to make corrections to your equipment selection. Ask about trying a different type of machine or mask if you have ongoing problems. Make sure that your mask is a good fit and learn to use your equipment properly.    5. Challenge  Tell a family member or close friend to ask you each morning if you used your CPAP the previous night. Have someone to challenge you to give it your best effort.    6. Connection   Your adjustment to CPAP will be easier if you are able to connect with others who use the same treatment. Ask your sleep doctor if there is a support group in your area for people who have sleep apnea, or look for one on the Internet.  7. Comfort   Increase your level of comfort by using a saline spray, decongestant or heated humidifier if CPAP irritates your nose, mouth or throat. Use your unit's \"ramp\" setting to slowly get used to the air pressure level. There may be soft pads you can buy that will fit over your mask straps. Look on www.CPAP.com for accessories that can help make CPAP use more comfortable.  8. Cleaning   Clean your mask, tubing and headgear on a regular basis. Put this time in your schedule so that you don't forget to do it. Check and replace the filters for your CPAP unit and humidifier.    9. Completion   Although you are never finished with CPAP therapy, you should reward yourself by celebrating the completion of your first month of treatment. Expect this first month to be your hardest period of " adjustment. It will involve some trial and error as you find the machine, mask and pressure settings that are right for you.    10. Continuation  After your first month of treatment, continue to make a daily commitment to use your CPAP all night, every night and for every nap.    CPAP-Tips to starting with success:  Begin using your CPAP for short periods of time during the day while you watch TV or read.    Use CPAP every night and for every nap. Using it less often reduces the health benefits and makes it harder for your body to get used to it.    Make small adjustments to your mask, tubing, straps and headgear until you get the right fit. Tightening the mask may actually worsen the leak.  If it leaves significant marks on your face or irritates the bridge of your nose, it may not be the best mask for you.  Speak with the person who supplied the mask and consider trying other masks. Insurances will allow you to try different masks during the first month of starting CPAP.  Insurance also covers a new mask, hose and filter about every 6 months.    Use a saline nasal spray to ease mild nasal congestion. Neti-Pot or saline nasal rinses may also help. Nasal gel sprays can help reduce nasal dryness.  Biotene mouthwash can be helpful to protect your teeth if you experience frequent dry mouth.  Dry mouth may be a sign of air escaping out of your mouth or out of the mask in the case of a full face mask.  Speak with your provider if you expect that is the case.     Take a nasal decongestant to relieve more severe nasal or sinus congestion.  Do not use Afrin (oxymetazoline) nasal spray more than 3 days in a row.  Speak with your sleep doctor if your nasal congestion is chronic.    Use a heated humidifier that fits your CPAP model to enhance your breathing comfort. Adjust the heat setting up if you get a dry nose or throat, down if you get condensation in the hose or mask.  Position the CPAP lower than you so that any  condensation in the hose drains back into the machine rather than towards the mask.    Try a system that uses nasal pillows if traditional masks give you problems.    Clean your mask, tubing and headgear once a week. Make sure the equipment dries fully.    Regularly check and replace the filters for your CPAP unit and humidifier.    Work closely with your sleep provider and your CPAP supplier to make sure that you have the machine, mask and air pressure setting that works best for you. It is better to stop using it and call your provider to solve problems than to lay awake all night frustrated with the device.    BESIDES CPAP, WHAT OTHER THERAPIES ARE THERE?      Positioning Device  Positioning devices are generally used when sleep apnea is mild and only occurs on your back.This example shows a pillow that straps around the waist. It may be appropriate for those whose sleep study shows milder sleep apnea that occurs primarily when lying flat on one's back. Preliminary studies have shown benefit but effectiveness at home may need to be verified by a home sleep test. These devices are generally not covered by medical insurance.                      Oral Appliance  What is oral appliance therapy?  An oral appliance is a small acrylic device that fits over the upper and lower teeth or tongue (similar to an orthodontic retainer or a mouth guard). This device slightly advances the lower jaw or tongue, which moves the base of the tongue forward, opens the airway, improves breathing and can effectively treat snoring and obstructive sleep apnea sleep apnea. The appliance is fabricated and customized by a qualified dentist with experience in treating snoring and sleep apnea. Oral appliances are usually well tolerated and have relatively high compliance by patients1, 2, 3.  When is an oral appliance indicated?  Oral appliance therapy is recommended as a first-line treatment for patients with primary snoring, mild sleep apnea,  and for patients with moderate sleep apnea who prefer appliance therapy to use of CPAP4, 5. Severity of sleep apnea is determined by sleep testing and is based on the number of respiratory events per hour of sleep.   How successful is oral appliance therapy?  The success rate of oral appliance therapy in patients with mild sleep apnea is 75-80% while in patients with moderate sleep apnea it is 50-70%. The chance of success in patients with severe sleep apnea is 40-50%. The research also shows that oral appliances have a beneficial effect on the cardiovascular health of PAN patients at the same magnitude as CPAP therapy7.  Oral appliances should be a second-line treatment in cases of severe sleep apnea, but if not completely successful then a combination therapy utilizing CPAP plus oral appliance therapy may be effective. Oral appliances tend to be effective in a broad range of patients although studies show that the patients who have the highest success are females, younger patients, those with milder disease, and less severe obesity. 3, 6.   The chances of success are lower in patients who have more severe PAN, are older, and those who are morbidly obese.     Example of an oral appliance   Finding a dentist that practices dental sleep medicine  Specific training is available through the American Academy of Dental Sleep Medicine for dentists interested in working in the field of sleep. To find a dentist who is educated in the field of sleep and the use of oral appliances, near you, visit the Web site of the American Academy of Dental Sleep Medicine; also see   http://www.accpstorage.org/newOrganization/patients/oralAppliances.pdf  To search for a dentist certified in these practices:  Http://aadsm.org/FindADentist.aspx?1  1. Ricardo et al. Objectively measured vs self-reported compliance during oral appliance therapy for sleep-disordered breathing. Chest 2013; 144(5): 0347-0418.  2. Marcie et al. Objective  measurement of compliance during oral appliance therapy for sleep-disordered breathing. Thorax 2013; 68(1): 91-96.  3. Mariela et al. Mandibular advancement devices in 620 men and women with PAN and snoring: tolerability and predictors of treatment success. Chest 2004; 125: 4890-7725.  4. David et al. Oral appliances for snoring and PAN: a review. Sleep 2006; 29: 244-262.  5. Kenia et al. Oral appliance treatment for PNA: an update. J Clin Sleep Med 2014; 10(2): 215-227.  6. Carolyn et al. Predictors of OSAH treatment outcome. J Dent Res 2007; 86: 1553-0218.    Nasal Valves                 Nasal valves may not be effective if you have frequent nasal congestion or have difficulty breathing through your nose. They may be an option for mild apnea if other options are not well tolerated. The efficacy of these devices is generally less than CPAP or oral appliances.      Weight Loss:    Weight management is a personal decision.  If you are interested in exploring weight loss strategies, the following discussion covers the impact on weight loss on sleep apnea and the approaches that may be successful.    Weight loss decreases severity of sleep apnea in most people with obesity. For those with mild obesity who have developed snoring with weight gain, even 15-30 pound weight loss can improve and occasionally eliminate sleep apnea.  Structured and life-long dietary and health habits are necessary to lose weight and keep healthier weight levels.     Though there may be significant health benefits from weight loss, long-term weight loss is very difficult to achieve- studies show success with dietary management in less than 10% of people. In addition, substantial weight loss may require years of dietary control and may be difficult if patients have severe obesity. In these cases, surgical management may be considered.  Finally, older individuals who have tolerated obesity without health complications may be less  likely to benefit from weight loss strategies.    Your BMI is Body mass index is 62.42 kg/(m^2).  Body mass index (BMI) is one way to tell whether you are at a healthy weight, overweight, or obese. It measures your weight in relation to your height.  A BMI of 18.5 to 24.9 is in the healthy range. A person with a BMI of 25 to 29.9 is considered overweight, and someone with a BMI of 30 or greater is considered obese. More than two-thirds of American adults are considered overweight or obese.  Being overweight or obese increases the risk for further weight gain. Excess weight may lead to heart disease and diabetes.  Creating and following plans for healthy eating and physical activity may help you improve your health.  Weight control is part of healthy lifestyle and includes exercise, emotional health, and healthy eating habits. Careful eating habits lifelong are the mainstay of weight control. Though there are significant health benefits from weight loss, long-term weight loss with diet alone may be very difficult to achieve- studies show long-term success with dietary management in less than 10% of people. Attaining a healthy weight may be especially difficult to achieve in those with severe obesity. In some cases, medications, devices and surgical management might be considered.  What can you do?  If you are overweight or obese and are interested in methods for weight loss, you should discuss this with your provider.     Consider reducing daily calorie intake by 500 calories.     Keep a food journal.     Avoiding skipping meals, consider cutting portions instead.    Diet combined with exercise helps maintain muscle while optimizing fat loss. Strength training is particularly important for building and maintaining muscle mass. Exercise helps reduce stress, increase energy, and improves fitness. Increasing exercise without diet control, however, may not burn enough calories to loose weight.       Start walking three  days a week 10-20 minutes at a time    Work towards walking thirty minutes five days a week     Eventually, increase the speed of your walking for 1-2 minutes at time    In addition, we recommend that you review healthy lifestyles and methods for weight loss available through the National Institutes of Health patient information sites:  http://win.niddk.nih.gov/publications/index.htm    And look into health and wellness programs that may be available through your health insurance provider, employer, local community center, or domitila club.    Weight management plan: Patient was referred to their PCP to discuss a diet and exercise plan.    Surgery:    Upper Airway Surgery for PAN  Surgery for PAN is a second-line treatment option in the management of sleep apnea.  Surgery should be considered for patients who are having a difficult time tolerating CPAP.    Surgery for PAN is directed at areas that are responsible for narrowing or complete obstruction of the airway during sleep.  There are a wide range of procedures available to enlarge and/or stabilize the airway to prevent blockage of breathing in the three major areas where it can occur: the palate, tongue, and nasal regions.  Successful surgical treatment depends on the accurate identification of the factors responsible for obstructive sleep apnea in each person.  A personalized approach is required because there is no single treatment that works well for everyone.  Because of anatomic variation, consultation with an examination by a sleep surgeon is a critical first step in determining what surgical options are best for each patient.  In some cases, examination during sedation may be recommended in order to guide the selection of procedures.  Patients will be counseled about risks and benefits as well as the typical recovery course after surgery. Surgery is typically not a cure for a person s PAN.  However, surgery will often significantly improve one s PAN  severity (termed  success rate ).  Even in the absence of a cure, surgery will decrease the cardiovascular risk associated with OSA7; improve overall quality of life8 (sleepiness, functionality, sleep quality, etc).          Palate Procedures:  Patients with PAN often have narrowing of their airway in the region of their tonsils and uvula.  The goals of palate procedures are to widen the airway in this region as well as to help the tissues resist collapse.  Modern palate procedure techniques focus on tissue conservation and soft tissue rearrangement, rather than tissue removal.  Often the uvula is preserved in this procedure. Residual sleep apnea is common in patient after pharyngoplasty with an average reduction in sleep apnea events of 33%2.      Tongue Procedures:  While patients are awake, the muscles that surround the throat are active and keep this region open for breathing. These muscles relax during sleep, allowing the tongue and other structures to collapse and block breathing.  There are several different tongue procedures available.  Selection of a tongue base procedure depends on characteristics seen on physical exam.  Generally, procedures are aimed at removing bulky tissues in this area or preventing the back of the tongue from falling back during sleep.  Success rates for tongue surgery range from 50-62%3.    Hypoglossal Nerve Stimulation:  Hypoglossal nerve stimulation has recently received approval from the [place] Food and Drug Administration for the treatment of obstructive sleep apnea.  This is based on research showing that the system was safe and effective in treating sleep apnea6.  Results showed that the median AHI score decreased 68%, from 29.3 to 9.0. This therapy uses an implant system that senses breathing patterns and delivers mild stimulation to airway muscles, which keeps the airway open during sleep.  The system consists of three fully implanted components: a small generator  (similar in size to a pacemaker), a breathing sensor, and a stimulation lead.  Using a small handheld remote, a patient turns the therapy on before bed and off upon awakening.    Candidates for this device must be greater than 22 years of age, have moderate to severe PAN (AHI between 20-65), BMI less than 32, have tried CPAP/oral appliance without success, and have appropriate upper airway anatomy (determined by a sleep endoscopy performed by Dr. Power).    Hypoglossal Nerve Stimulation Pathway:    The sleep surgeon s office will work with the patient through the insurance prior-authorization process (including communications and appeals).    Nasal Procedures:  Nasal obstruction can interfere with nasal breathing during the day and night.  Studies have shown that relief of nasal obstruction can improve the ability of some patients to tolerate positive airway pressure therapy for obstructive sleep apnea1.  Treatment options include medications such as nasal saline, topical corticosteroid and antihistamine sprays, and oral medications such as antihistamines or decongestants. Non-surgical treatments can include external nasal dilators for selected patients. If these are not successful by themselves, surgery can improve the nasal airway either alone or in combination with these other options.      Combination Procedures:  Combination of surgical procedures and other treatments may be recommended, particularly if patients have more than one area of narrowing or persistent positional disease.  The success rate of combination surgery ranges from 66-80%2,3.      1. Eveline GILLIS. The Role of the Nose in Snoring and Obstructive Sleep Apnoea: An Update.  Eur Arch Otorhinolaryngol. 2011; 268: 1365-73.  2.  Duran SM; Trent JA; Hunter JR; Pallanch JF; Lindsay CARLTON; Carola THOMAS; Jj LING. Surgical modifications of the upper airway for obstructive sleep apnea in adults: a systematic review and meta-analysis. SLEEP  2010;33(10):1226-4803. Raphael STEVENS. Hypopharyngeal surgery in obstructive sleep apnea: an evidence-based medicine review.  Arch Otolaryngol Head Neck Surg. 2006 Feb;132(2):206-13.  3. Nicolás BOWER, Vinnie Y, Jaylon JAMILA. The efficacy of anatomically based multilevel surgery for obstructive sleep apnea. Otolaryngol Head Neck Surg. 2003 Oct;129(4):327-35.  4. Kezirian E, Goldberg A. Hypopharyngeal Surgery in Obstructive Sleep Apnea: An Evidence-Based Medicine Review. Arch Otolaryngol Head Neck Surg. 2006 Feb;132(2):206-13.  5. Radha MORALES et al. Upper-Airway Stimulation for Obstructive Sleep Apnea.  N Engl J Med. 2014 Jan 9;370(2):139-49.  6. Colin Y et al. Increased Incidence of Cardiovascular Disease in Middle-aged Men with Obstructive Sleep Apnea. Am J Respir Crit Care Med; 2002 166: 159-165  7. Tomas EM et al. Studying Life Effects and Effectiveness of Palatopharyngoplasty (SLEEP) study: Subjective Outcomes of Isolated Uvulopalatopharyngoplasty. Otolaryngol Head Neck Surg. 2011; 144: 623-631.  8.   Your blood pressure was checked while you were in clinic today.  Please read the guidelines below about what these numbers mean and what you should do about them.  Your systolic blood pressure is the top number.  This is the pressure when the heart is pumping.  Your diastolic blood pressure is the bottom number.  This is the pressure in between beats.  If your systolic blood pressure is less than 120 and your diastolic blood pressure is less than 80, then your blood pressure is normal. There is nothing more that you need to do about it  If your systolic blood pressure is 120-139 or your diastolic blood pressure is 80-89, your blood pressure may be higher than it should be.  You should have your blood pressure re-checked within a year by a primary care provider.  If your systolic blood pressure is 140 or greater or your diastolic blood pressure is 90 or greater, you may have high blood pressure.  High blood pressure is treatable,  but if left untreated over time it can put you at risk for heart attack, stroke, or kidney failure.  You should have your blood pressure re-checked by a primary care provider within the next four weeks.      Good Sleep hygiene tips (American Academy of Sleep Medicine):  Maintain a regular sleep-wake routine    Go to bed at the same time. Wake up at the same time. Ideally, your schedule will remain the same (+/- 20 minutes) every night of the week.  Avoid naps if possible    Naps decrease the  Sleep Debt  that is so necessary for easy sleep onset.    Each of us needs a certain amount of sleep per 24-hour period. We need that amount, and we don t need more than that.    When we take naps, it decreases the amount of sleep that we need the next night - which may cause sleep fragmentation and difficulty initiating sleep, and may lead to insomnia.  Don t stay in bed awake for more than 15-20 minutes (Stimulus control)    If you find your mind racing, or worrying about not being able to sleep during the middle of the night, get out of bed, and sit in a chair in the dark. Do your mind racing in the chair until you are sleepy, then return to bed. No TV, or phone/Ipad, or computer or internet or eat during these periods! That will just stimulate you more than desired.    If this happens several times during the night, that is OK. Just maintain your regular wake time, and try to avoid naps.  Don t watch TV, phone, computer, video games or read in bed or eat in bed.    When you watch TV or read in bed or eat in bed, you associate the bed with wakefulness.    The bed is reserved for two things - sleep and hanky panky.  Drink caffeinated drinks with caution    The effects of caffeine may last for several hours after ingestion. Caffeine can fragment sleep, and cause difficulty initiating sleep. If you drink caffeine, use it only before noon.    Remember that soda and tea contain caffeine as well.  Avoid inappropriate substances  that interfere with sleep    Cigarettes, alcohol, and over-the-counter medications may cause fragmented sleep.  Exercise regularly    Exercise promotes continuous sleep.    Rigorous exercise (close to bedtime cause) circulates endorphins into the body which may cause difficulty initiating sleep.   Have a quiet, comfortable bedroom    Set your bedroom thermostat at a comfortable temperature. Generally, a little cooler is better than a little warmer.    Turn off the TV and other extraneous noise that may disrupt sleep. Background  white noise  like a fan is OK.    If your pets awaken you, keep them outside the bedroom.    Your bedroom should be dark. Turn off bright lights.    Have a comfortable mattress.  If you are a  clock watcher  at night, hide the clock.      Have a comfortable pre-bedtime routine    A warm bath, shower    Meditation, or quiet time    Wind-down 20 minutes prior of bedtime.

## 2018-10-20 ENCOUNTER — THERAPY VISIT (OUTPATIENT)
Dept: SLEEP MEDICINE | Facility: CLINIC | Age: 38
End: 2018-10-20
Payer: COMMERCIAL

## 2018-10-20 DIAGNOSIS — G47.33 OSA (OBSTRUCTIVE SLEEP APNEA): Primary | ICD-10-CM

## 2018-10-20 DIAGNOSIS — G47.9 SLEEP DISTURBANCE: ICD-10-CM

## 2018-10-20 DIAGNOSIS — E66.01 MORBID OBESITY WITH BMI OF 60.0-69.9, ADULT (H): ICD-10-CM

## 2018-10-20 PROCEDURE — 95810 POLYSOM 6/> YRS 4/> PARAM: CPT | Performed by: INTERNAL MEDICINE

## 2018-10-20 NOTE — MR AVS SNAPSHOT
After Visit Summary   10/20/2018    Svetlana Perry    MRN: [id number]           Patient Information     Date Of Birth          1980        Visit Information        Provider Department      10/20/2018 8:30 PM BED 2  S[facility]EP [facility]        Today's Diagnoses     Sleep disturbance        Morbid obesity with BMI of 60.0-69.9, adult (H)           Follow-ups after your visit        Your next 10 appointments already scheduled     Oct 29, 2018  2:00 PM CDT   Return Sleep Patient with Jensen Dallas MD   [facility] ([facility]    [address]  [facility] [address]   [phone]            Nov 19, 2018 10:00 AM CST   (Arrive by 9:45 AM)   Return Nutrition with Mabel Agrawal RD   [facility] ([facility])    [address] [facility] [address] [phone]              Who to contact     If you have questions or need follow up information about today's clinic visit or your schedule please contact [facility] directly at [phone].  Normal or non-critical lab and imaging results will be communicated to you by MyChart, letter or phone within 4 business days after the clinic has received the results. If you do not hear from us within 7 days, please contact the clinic through MyChart or phone. If you have a critical or abnormal lab result, we will notify you by phone as soon as possible.  Submit refill requests through Netlogon or call your pharmacy and they will forward the refill request to us. Please allow 3 business days for your refill to be completed.          Additional Information About Your Visit        MyChart Information     Netlogon gives you secure access to your electronic health record. If you see a primary care provider, you can also send messages to your care team and make appointments. If you have  questions, please call your primary care clinic.  If you do not have a primary care provider, please call [phone] and they will assist you.        Care EveryWhere ID     This is your Care EveryWhere ID. This could be used by other organizations to access your [facility] medical records  [id number]         Blood Pressure from Last 3 Encounters:   09/20/18 131/87   08/24/18 127/90   06/06/18 (!) 148/96    Weight from Last 3 Encounters:   09/20/18 (!) 162.4 kg (358 lb)   09/13/18 (!) 162.6 kg (358 lb 8 oz)   08/24/18 (!) 164.7 kg (363 lb)              We Performed the Following     Comprehensive Sleep Study        Primary Care Provider Office Phone # Fax #    Edel Herzog [phone] [address]  [facility] [address]        Equal Access to Services     ENOCH MEDRANO : Hadii aad ku hadasho Soomaali, waaxda luqadaha, qaybta kaalmada adeegyada, waxay milesin haysantosn sherri landa . So [facility] [place] [phone].    ATENCIÓN: Si habla español, tiene a ghosh disposición servicios gratuitos de asistencia lingüística. [place] al [phone].    We comply with applicable federal civil rights laws and [place] laws. We do not discriminate on the basis of race, color, national origin, age, disability, sex, sexual orientation, or gender identity.            Thank you!     Thank you for choosing [facility]  for your care. Our goal is always to provide you with excellent care. Hearing back from our patients is one way we can continue to improve our services. Please take a few minutes to complete the written survey that you may receive in the mail after your visit with us. Thank you!             Your Updated Medication List - Protect others around you: Learn how to safely use, store and throw away your medicines at www.disposemymeds.org.          This list is accurate as of 10/20/18 11:59 PM.  Always use your most recent med list.                   Brand Name Dispense Instructions  for use Diagnosis    buPROPion 75 MG tablet    WELLBUTRIN    60 tablet    Take 1 tablet (75 mg) by mouth 2 times daily    Depression, Anxiety       vitamin D 2000 units tablet     100 tablet    Take 2,000 Units by mouth daily    Vitamin D deficiency       zolpidem 5 MG tablet    AMBIEN    1 tablet    Take tablet by mouth 15 minutes prior to sleep, for Sleep Study    Sleep disturbance

## 2018-10-22 ENCOUNTER — TELEPHONE (OUTPATIENT)
Dept: SLEEP MEDICINE | Facility: CLINIC | Age: 38
End: 2018-10-22

## 2018-10-22 LAB — SLPCOMP: NORMAL

## 2018-10-22 NOTE — PROCEDURES
" SLEEP STUDY INTERPRETATION  DIAGNOSTIC POLYSOMNOGRAPHY REPORT      Patient: PACO GREEN  YOB: 1980  Study Date: 10/20/2018  MRN: [id number]  Referring Provider: Alice Mccain PA-C   Ordering Provider: Jensen Dallas MD    Indications for Polysomnography: The patient is a 37 y old Female who is 5' 3\" and weighs 358.0 lbs. Her BMI is 62.6, [place] sleepiness scale 1.0 and neck circumference is 41.0 cm. Relevant medical history includes morbid obesity for pre-op evaluation of bariatric surgery, depression, anxiety (in remission), GERD and kidney stone. A diagnostic polysomnogram was performed to evaluate for sleep apnea, periodic leg movements, CSA, hypoventilation and hypoxemia.    Polysomnogram Data: A full night polysomnogram recorded the standard physiologic parameters including EEG, EOG, EMG, ECG, nasal and oral airflow. Respiratory parameters of chest and abdominal movements were recorded with respiratory inductance plethysmography. Oxygen saturation was recorded by pulse oximetry. Hypopnea scoring rule used: 1B 4%.    Sleep Architecture: All stages of sleep were achieved with reduced REM sleep and N3. There were increased sleep fragmentation and poor sleep efficiency.  The total recording time of the polysomnogram was 543.0 minutes. The total sleep time was 483.5 minutes. Sleep latency was normal at 12.0 minutes without the use of a sleep aid. REM latency was 220.5 minutes. Arousal index was increased at 41.3 arousals per hour. Sleep efficiency was mildly decreased at 89.0%. Wake after sleep onset was 46.0 minutes. The patient spent 7.7% of total sleep time in Stage N1, 75.7% in Stage N2, 2.8% in Stage N3, and 13.9% in REM. Time in REM supine was 0 minutes.    Respiration: There was severe sleep disordered breathing, characterized predominantly by obstructive respiratory apneas and hypopneas with  sleep related hypoxemia.    Events ? The polysomnogram revealed a presence of 39 " obstructive, 0 central, and 0 mixed apneas resulting in an apnea index of 4.8 events per hour. There were 271 obstructive hypopneas and 0 central hypopneas resulting in an obstructive hypopnea index of 33.6 and central hypopnea index of 0 events per hour. The combined apnea/hypopnea index was 38.5 events per hour (central apnea/hypopnea index was - events per hour). The REM AHI was 69.0 events per hour. The supine AHI was - events per hour. The RERA index was 10.2 events per hour.  The RDI was 48.6 events per hour.    Snoring - was reported as mild to moderate.    Respiratory rate and pattern - was notable for tachypnea respiratory rate and pattern.    Sustained Sleep Associated Hypoventilation - Transcutaneous carbon dioxide monitoring was used, however significant hypoventilation was not present with a maximum change from 33.8 to 44.6 mmHg and 0 minutes at or greater than 55 mmHg.    Sleep Associated Hypoxemia - (Greater than 5 minutes O2 sat at or below 88%) was present. Baseline oxygen saturation was 95.7%. Lowest oxygen saturation was 69.4%. Time spent less than or equal to 88% was 6.2 minutes. Time spent less than or equal to 89% was 9.1 minutes.    Movement Activity: There were few periodic leg movements without significant associated arousals. There was no abnormal nocturnal sleep behavior.    Periodic Limb Activity - There were 36 PLMs during the entire study. The PLM index was 4.5 movements per hour. The PLM Arousal Index was 0.1 per hour.    REM EMG Activity - Excessive transient/sustained muscle activity was not present.    Nocturnal Behavior - Abnormal sleep related behaviors were not noted during NREM / REM sleep.     Bruxism - None apparent.    Cardiac Summary: There was sinus tachycardia rhythm throughout the night.  The average pulse rate was 97.0 bpm. The minimum pulse rate was 33.8 bpm while the maximum pulse rate was 118.1 bpm.  Arrhythmias were noted.      Assessment:     Obstructive Sleep Apnea  G47.33    Sleep Hypoxemia G47.36     Recommendations:    Recommend repeat polysomnography with full night titration of positive airway pressure therapy with TCM CO2 for the control of sleep disordered breathing.    Advice regarding the risks of drowsy driving.    Suggest optimizing sleep schedule and avoiding sleep deprivation.    Weight management (if BMI > 30).    Avoid sedating medications, including narcotics, and alcohol, as these may exacerbate sleep apnea and/or underlying respiratory disorders.     Follow up primary care doctor and/or referring physician as scheduled.         _____________________________________   electronically signed by: AUGUST RUCKER MD (10/22/18)    cc: Alice Mccain PA-C

## 2018-10-29 ENCOUNTER — OFFICE VISIT (OUTPATIENT)
Dept: SLEEP MEDICINE | Facility: CLINIC | Age: 38
End: 2018-10-29
Payer: COMMERCIAL

## 2018-10-29 VITALS
HEIGHT: 64 IN | OXYGEN SATURATION: 98 % | DIASTOLIC BLOOD PRESSURE: 91 MMHG | WEIGHT: 293 LBS | RESPIRATION RATE: 22 BRPM | HEART RATE: 88 BPM | BODY MASS INDEX: 50.02 KG/M2 | SYSTOLIC BLOOD PRESSURE: 142 MMHG

## 2018-10-29 DIAGNOSIS — E66.01 MORBID OBESITY WITH BMI OF 60.0-69.9, ADULT (H): ICD-10-CM

## 2018-10-29 DIAGNOSIS — G47.33 OSA (OBSTRUCTIVE SLEEP APNEA): Primary | ICD-10-CM

## 2018-10-29 DIAGNOSIS — G47.34 SLEEP RELATED HYPOXIA: ICD-10-CM

## 2018-10-29 PROCEDURE — 99213 OFFICE O/P EST LOW 20 MIN: CPT | Performed by: INTERNAL MEDICINE

## 2018-10-29 NOTE — PROGRESS NOTES
Sleep Study Follow-Up Visit:    Date on this visit: 10/29/2018    ASSESSMENT / PLAN:       PAN (obstructive sleep apnea)  Sleep related hypoxia  Morbid obesity with BMI of 60.0-69.9, adult (H)  This is a patient with the results of sleep study which shows an severe sleep apnea with with sleep-related hypoxemia, we recommend all-night CPAP titration versus treatment with auto-CPAP with 8-16 cmH2O.  She needs to be treated with CPAP therapy for gastric bypass surgery plan is.  Patient is surprised that that she has severe sleep apnea she does not want any treatment or CPAP titration at this time and she will think about it with her options are.  And also she likes like to postpone the bariatric surgery.  Patient will call back will option treatment or titration she wants in future.    All questions were answered.  The patient indicates understanding of the above issues and agrees with the plan set forth.      No orders of the defined types were placed in this encounter.      She will follow up with me as needed      BRIEF SUMMARY:    Svetlana Perry is a 37 year old female with history of morbid obesity for pre-op evaluation of bariatric surgery, depression, anxiety (in remission), GERD and kidney stone comes in today for follow-up of her sleep study done on 10/20/18 at the [facility] for result of sleep study. Please see H&P for his presenting sleep symptoms for additional details.      PSG: 10/20/18  Sleep latency 12 minutes without sleep aid.    REM achieved.   REM latency 220.5 minutes.    Sleep efficiency 89%. Total sleep time 483.5 minutes.  Sleep architecture:  Stage 1, 7.7% (5%), stage 2, 75.7% (45-55%), stage 3, 2.8% (15-20%), stage REM, 13.9% (20-25%).    AHI was 38.5/hour.   CSAI was 0/hour.   RDI 48.6/hour.    REM AHI 69/hour.    Supine AHI N/A.    Lowest O2 saturation:69.4%  S/He spent 6.2 minutes below 88% SpO2.   Periodic Limb Movement Index 4.5/hour.       These findings were reviewed  "with the patient and copy of the sleep study result was given.    Past medical/surgical history, family history, social history, medications and allergies were reviewed.      Problem List:  Patient Active Problem List    Diagnosis Date Noted     Morbid obesity (H) 08/24/2018     Priority: Medium     Vitamin D deficiency 06/14/2018     Priority: Medium     Hypoalbuminemia due to protein-calorie malnutrition (H) 06/14/2018     Priority: Medium     Iron deficiency anemia secondary to inadequate dietary iron intake 06/14/2018     Priority: Medium     Depression      Priority: Medium     Anxiety      Priority: Medium     CARDIOVASCULAR SCREENING; LDL GOAL LESS THAN 160 01/18/2012     Priority: Medium     Scar condition and fibrosis of skin 01/22/2007     Priority: Medium     HAD COLOSTOMY AS A CHILD, SCARRING ON ABD  11/15- MFM US SHOWS FLUID COLLECTION IN ABD  1/22- SURGERY RECORDS REQUESTED____  MD CONSULT____               Medications:     Current Outpatient Prescriptions   Medication Sig     buPROPion (WELLBUTRIN) 75 MG tablet Take 1 tablet (75 mg) by mouth 2 times daily     Cholecalciferol (VITAMIN D) 2000 units tablet Take 2,000 Units by mouth daily     zolpidem (AMBIEN) 5 MG tablet Take tablet by mouth 15 minutes prior to sleep, for Sleep Study     No current facility-administered medications for this visit.           PHYSICAL EXAMINATION:  BP (!) 142/91  Pulse 88  Resp 22  Ht 1.613 m (5' 3.5\")  Wt (!) 162.4 kg (358 lb)  SpO2 98%  BMI 62.42 kg/m2          Data: All pertinent previous laboratory data reviewed     No results found for: PH, PHARTERIAL, PO2, [id number], SAT, PCO2, HCO3, BASEEXCESS, HUNTER, BEB  Lab Results   Component Value Date    TSH 1.90 09/18/2006     Lab Results   Component Value Date    GLC 96 08/24/2018    GLC 90 06/06/2018     Lab Results   Component Value Date    HGB 11.4 (L) 08/24/2018    HGB 12.0 06/06/2018     Lab Results   Component Value Date    BUN 8 08/24/2018    BUN 10 06/06/2018 "    CR 0.65 08/24/2018    CR 0.72 06/06/2018     No results found for: GISELLE     Fifteen minutes spent with patient, all of which were spent face-to-face counseling, consulting, coordinating plan of care, going over sleep test results and chart review.          Jensen Dallas MD 10/29/2018   [facility]  [address], [place] [address]   [phone] Clinic      CC: Edel Herzog  Copy to: Edel Herzog

## 2018-10-29 NOTE — PATIENT INSTRUCTIONS

## 2018-10-29 NOTE — NURSING NOTE
"Chief Complaint   Patient presents with     RECHECK     F/u Psg split with TCM       Initial BP (!) 142/91  Pulse 88  Resp 22  Ht 1.613 m (5' 3.5\")  Wt (!) 162.4 kg (358 lb)  SpO2 98%  BMI 62.42 kg/m2 Estimated body mass index is 62.42 kg/(m^2) as calculated from the following:    Height as of this encounter: 1.613 m (5' 3.5\").    Weight as of this encounter: 162.4 kg (358 lb).    Medication Reconciliation: complete    Vanessa Flynn LPN/VIOLETTE    DME: N      "

## 2018-10-29 NOTE — MR AVS SNAPSHOT
After Visit Summary   10/29/2018    Svetlana Perry    MRN: [id number]           Patient Information     Date Of Birth          1980        Visit Information        Provider Department      10/29/2018 2:00 PM Jensen Dallas MD [facility]        Today's Diagnoses     PAN (obstructive sleep apnea)    -  1    Sleep related hypoxia        Morbid obesity with BMI of 60.0-69.9, adult (H)          Care Instructions      Your BMI is Body mass index is 62.42 kg/(m^2).  Weight management is a personal decision.  If you are interested in exploring weight loss strategies, the following discussion covers the approaches that may be successful. Body mass index (BMI) is one way to tell whether you are at a healthy weight, overweight, or obese. It measures your weight in relation to your height.  A BMI of 18.5 to 24.9 is in the healthy range. A person with a BMI of 25 to 29.9 is considered overweight, and someone with a BMI of 30 or greater is considered obese. More than two-thirds of American adults are considered overweight or obese.  Being overweight or obese increases the risk for further weight gain. Excess weight may lead to heart disease and diabetes.  Creating and following plans for healthy eating and physical activity may help you improve your health.  Weight control is part of healthy lifestyle and includes exercise, emotional health, and healthy eating habits. Careful eating habits lifelong are the mainstay of weight control. Though there are significant health benefits from weight loss, long-term weight loss with diet alone may be very difficult to achieve- studies show long-term success with dietary management in less than 10% of people. Attaining a healthy weight may be especially difficult to achieve in those with severe obesity. In some cases, medications, devices and surgical management might be considered.  What can you do?  If you are overweight or obese and are  interested in methods for weight loss, you should discuss this with your provider.     Consider reducing daily calorie intake by 500 calories.     Keep a food journal.     Avoiding skipping meals, consider cutting portions instead.    Diet combined with exercise helps maintain muscle while optimizing fat loss. Strength training is particularly important for building and maintaining muscle mass. Exercise helps reduce stress, increase energy, and improves fitness. Increasing exercise without diet control, however, may not burn enough calories to loose weight.       Start walking three days a week 10-20 minutes at a time    Work towards walking thirty minutes five days a week     Eventually, increase the speed of your walking for 1-2 minutes at time    In addition, we recommend that you review healthy lifestyles and methods for weight loss available through the National Institutes of Health patient information sites:  http://win.niddk.nih.gov/publications/index.htm    And look into health and wellness programs that may be available through your health insurance provider, employer, local community center, or domitila club.    Weight management plan: Patient was referred to their PCP to discuss a diet and exercise plan.                 Follow-ups after your visit        Your next 10 appointments already scheduled     Nov 19, 2018 10:00 AM CST   (Arrive by 9:45 AM)   Return Nutrition with Mabel Agrawal RD   [facility] ([facility])    [address] [place] [address] [phone]              Who to contact     If you have questions or need follow up information about today's clinic visit or your schedule please contact [facility] directly at [phone].  Normal or non-critical lab and imaging results will be communicated to you by MyChart, letter or phone within 4 business days after the clinic has received the  "results. If you do not hear from us within 7 days, please contact the clinic through LOAG or phone. If you have a critical or abnormal lab result, we will notify you by phone as soon as possible.  Submit refill requests through LOAG or call your pharmacy and they will forward the refill request to us. Please allow 3 business days for your refill to be completed.          Additional Information About Your Visit        [employer] Information     LOAG gives you secure access to your electronic health record. If you see a primary care provider, you can also send messages to your care team and make appointments. If you have questions, please call your primary care clinic.  If you do not have a primary care provider, please call [phone] and they will assist you.        Care EveryWhere ID     This is your Care EveryWhere ID. This could be used by other organizations to access your [facility] medical records  [id number]        Your Vitals Were     Pulse Respirations Height Pulse Oximetry BMI (Body Mass Index)       88 22 1.613 m (5' 3.5\") 98% 62.42 kg/m2        Blood Pressure from Last 3 Encounters:   10/29/18 (!) 142/91   09/20/18 131/87   08/24/18 127/90    Weight from Last 3 Encounters:   10/29/18 (!) 162.4 kg (358 lb)   09/20/18 (!) 162.4 kg (358 lb)   09/13/18 (!) 162.6 kg (358 lb 8 oz)              Today, you had the following     No orders found for display       Primary Care Provider Office Phone # Fax #    Edel Kalpana Herzog [phone] [address] [facility] [address]        Equal Access to Services     [facility] Hospital[facility] AH: Hadii sherry Lopez, jayjay resendiz, sigrid harmon. So [facility] [place] [facility] [phone].    ATENCIÓN: Si habla español, tiene a ghosh disposición servicios gratuitos de asistencia lingüística. [place] al [phone].    We comply with applicable federal civil rights laws and [place] laws. We do not " discriminate on the basis of race, color, national origin, age, disability, sex, sexual orientation, or gender identity.            Thank you!     Thank you for choosing [facility]  for your care. Our goal is always to provide you with excellent care. Hearing back from our patients is one way we can continue to improve our services. Please take a few minutes to complete the written survey that you may receive in the mail after your visit with us. Thank you!             Your Updated Medication List - Protect others around you: Learn how to safely use, store and throw away your medicines at www.disposemymeds.org.          This list is accurate as of 10/29/18  2:24 PM.  Always use your most recent med list.                   Brand Name Dispense Instructions for use Diagnosis    buPROPion 75 MG tablet    WELLBUTRIN    60 tablet    Take 1 tablet (75 mg) by mouth 2 times daily    Depression, Anxiety       vitamin D 2000 units tablet     100 tablet    Take 2,000 Units by mouth daily    Vitamin D deficiency       zolpidem 5 MG tablet    AMBIEN    1 tablet    Take tablet by mouth 15 minutes prior to sleep, for Sleep Study    Sleep disturbance

## 2018-10-30 ENCOUNTER — MYC MEDICAL ADVICE (OUTPATIENT)
Dept: SLEEP MEDICINE | Facility: CLINIC | Age: 38
End: 2018-10-30

## 2018-10-31 NOTE — TELEPHONE ENCOUNTER
From: Svetlana Perry  To: Jensen Dallas MD  Sent: 10/30/2018 12:59 PM CDT  Subject: A follow-up question for a visit within the past seven days    Bryanna Dallas,     Thank you for your time yesterday. After some thought, I wanted to come back and review the options available to assist me with my sleep diagnosis. Please assist in providing your 'next course of action' recommendation as we discussed, so that I may choose how to proceed.     Again thank you for your assistance.

## 2018-11-14 DIAGNOSIS — G47.33 OSA (OBSTRUCTIVE SLEEP APNEA): Primary | ICD-10-CM

## 2018-11-14 NOTE — PROGRESS NOTES
Patient decided to proceed to auto titrating CPAP therapy, will order auto titrating 0 8/16 cmH2O, and follow-up in 2 months.

## 2018-11-19 ENCOUNTER — TELEPHONE (OUTPATIENT)
Dept: SLEEP MEDICINE | Facility: CLINIC | Age: 38
End: 2018-11-19

## 2019-08-08 ENCOUNTER — TRANSFERRED RECORDS (OUTPATIENT)
Dept: HEALTH INFORMATION MANAGEMENT | Facility: CLINIC | Age: 39
End: 2019-08-08

## 2019-08-20 ENCOUNTER — TRANSFERRED RECORDS (OUTPATIENT)
Dept: HEALTH INFORMATION MANAGEMENT | Facility: CLINIC | Age: 39
End: 2019-08-20

## 2019-11-07 ENCOUNTER — HEALTH MAINTENANCE LETTER (OUTPATIENT)
Age: 39
End: 2019-11-07

## 2020-10-10 ENCOUNTER — APPOINTMENT (OUTPATIENT)
Dept: GENERAL RADIOLOGY | Facility: CLINIC | Age: 40
End: 2020-10-10
Attending: EMERGENCY MEDICINE
Payer: COMMERCIAL

## 2020-10-10 ENCOUNTER — HOSPITAL ENCOUNTER (EMERGENCY)
Facility: CLINIC | Age: 40
Discharge: HOME | End: 2020-10-10
Attending: EMERGENCY MEDICINE | Admitting: EMERGENCY MEDICINE
Payer: COMMERCIAL

## 2020-10-10 VITALS
DIASTOLIC BLOOD PRESSURE: 98 MMHG | SYSTOLIC BLOOD PRESSURE: 139 MMHG | TEMPERATURE: 98 F | OXYGEN SATURATION: 99 % | HEART RATE: 88 BPM

## 2020-10-10 DIAGNOSIS — R91.8 PULMONARY NODULES: ICD-10-CM

## 2020-10-10 DIAGNOSIS — M54.6 ACUTE LEFT-SIDED THORACIC BACK PAIN: ICD-10-CM
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
## 2020-10-10 LAB
HCG UR QL: NEGATIVE
INTERNAL QC OK POCT: YES

## 2020-10-10 PROCEDURE — 250N000013 HC RX MED GY IP 250 OP 250 PS 637: Performed by: EMERGENCY MEDICINE

## 2020-10-10 PROCEDURE — 71046 X-RAY EXAM CHEST 2 VIEWS: CPT

## 2020-10-10 PROCEDURE — 99283 EMERGENCY DEPT VISIT LOW MDM: CPT | Mod: 25 | Performed by: EMERGENCY MEDICINE

## 2020-10-10 PROCEDURE — 99284 EMERGENCY DEPT VISIT MOD MDM: CPT | Performed by: EMERGENCY MEDICINE

## 2020-10-10 PROCEDURE — 81025 URINE PREGNANCY TEST: CPT | Performed by: EMERGENCY MEDICINE

## 2020-10-10 PROCEDURE — 71046 X-RAY EXAM CHEST 2 VIEWS: CPT | Mod: 26 | Performed by: RADIOLOGY

## 2020-10-10 PROCEDURE — 99283 EMERGENCY DEPT VISIT LOW MDM: CPT | Performed by: EMERGENCY MEDICINE

## 2020-10-10 RX ORDER — IBUPROFEN 600 MG/1
600 TABLET, FILM COATED ORAL EVERY 6 HOURS PRN
Qty: 30 TABLET | Refills: 0 | Status: SHIPPED | OUTPATIENT
Start: 2020-10-10 | End: 2023-12-19

## 2020-10-10 RX ORDER — IBUPROFEN 600 MG/1
600 TABLET, FILM COATED ORAL EVERY 6 HOURS PRN
Qty: 30 TABLET | Refills: 0 | Status: SHIPPED | OUTPATIENT
Start: 2020-10-10 | End: 2020-10-10

## 2020-10-10 RX ORDER — IBUPROFEN 800 MG/1
800 TABLET, FILM COATED ORAL ONCE
Status: COMPLETED | OUTPATIENT
Start: 2020-10-10 | End: 2020-10-10

## 2020-10-10 RX ORDER — CYCLOBENZAPRINE HCL 10 MG
10 TABLET ORAL 3 TIMES DAILY PRN
Qty: 20 TABLET | Refills: 0 | Status: SHIPPED | OUTPATIENT
Start: 2020-10-10 | End: 2020-10-10

## 2020-10-10 RX ORDER — CYCLOBENZAPRINE HCL 5 MG
10 TABLET ORAL ONCE
Status: COMPLETED | OUTPATIENT
Start: 2020-10-10 | End: 2020-10-10

## 2020-10-10 RX ORDER — CYCLOBENZAPRINE HCL 10 MG
10 TABLET ORAL 3 TIMES DAILY PRN
Qty: 20 TABLET | Refills: 0 | Status: SHIPPED | OUTPATIENT
Start: 2020-10-10 | End: 2023-12-19

## 2020-10-10 RX ADMIN — IBUPROFEN 800 MG: 800 TABLET, FILM COATED ORAL at 20:19

## 2020-10-10 RX ADMIN — CYCLOBENZAPRINE HYDROCHLORIDE 10 MG: 5 TABLET, FILM COATED ORAL at 20:19
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
## 2020-10-10 NOTE — ED AVS SNAPSHOT
[facility] Emergency Department  [address] [address]  Phone: [phone]                                    Svetlana Perry   MRN: [id number]    Department: [facility] Emergency Department   Date of Visit: 10/10/2020           After Visit Summary Signature Page    I have received my discharge instructions, and my questions have been answered. I have discussed any challenges I see with this plan with the nurse or doctor.    ..........................................................................................................................................  Patient/Patient Representative Signature      ..........................................................................................................................................  Patient Representative Print Name and Relationship to Patient    ..................................................               ................................................  Date                                   Time    ..........................................................................................................................................  Reviewed by Signature/Title    ...................................................              ..............................................  Date                                               Time          22EPIC Rev 08/18
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
## 2020-11-28 ENCOUNTER — MYC MEDICAL ADVICE (OUTPATIENT)
Dept: FAMILY MEDICINE | Facility: CLINIC | Age: 40
End: 2020-11-28

## 2020-11-29 ENCOUNTER — HEALTH MAINTENANCE LETTER (OUTPATIENT)
Age: 40
End: 2020-11-29

## 2021-03-31 ENCOUNTER — MYC MEDICAL ADVICE (OUTPATIENT)
Dept: BEHAVIORAL HEALTH | Facility: CLINIC | Age: 41
End: 2021-03-31

## 2021-09-25 ENCOUNTER — HEALTH MAINTENANCE LETTER (OUTPATIENT)
Age: 41
End: 2021-09-25

## 2022-01-15 ENCOUNTER — HEALTH MAINTENANCE LETTER (OUTPATIENT)
Age: 42
End: 2022-01-15

## 2022-12-26 ENCOUNTER — HEALTH MAINTENANCE LETTER (OUTPATIENT)
Age: 42
End: 2022-12-26

## 2023-04-22 ENCOUNTER — HEALTH MAINTENANCE LETTER (OUTPATIENT)
Age: 43
End: 2023-04-22

## 2023-12-19 ENCOUNTER — VIRTUAL VISIT (OUTPATIENT)
Dept: FAMILY MEDICINE | Facility: CLINIC | Age: 43
End: 2023-12-19
Payer: COMMERCIAL

## 2023-12-19 DIAGNOSIS — Z13.6 CARDIOVASCULAR SCREENING; LDL GOAL LESS THAN 160: ICD-10-CM

## 2023-12-19 DIAGNOSIS — R73.01 IMPAIRED FASTING GLUCOSE: ICD-10-CM

## 2023-12-19 DIAGNOSIS — E46 HYPOALBUMINEMIA DUE TO PROTEIN-CALORIE MALNUTRITION (H): ICD-10-CM

## 2023-12-19 DIAGNOSIS — M17.11 PRIMARY OSTEOARTHRITIS OF RIGHT KNEE: ICD-10-CM

## 2023-12-19 DIAGNOSIS — E66.01 MORBID OBESITY (H): Primary | ICD-10-CM

## 2023-12-19 DIAGNOSIS — E88.09 HYPOALBUMINEMIA DUE TO PROTEIN-CALORIE MALNUTRITION (H): ICD-10-CM

## 2023-12-19 DIAGNOSIS — E55.9 VITAMIN D DEFICIENCY: ICD-10-CM

## 2023-12-19 DIAGNOSIS — D50.8 IRON DEFICIENCY ANEMIA SECONDARY TO INADEQUATE DIETARY IRON INTAKE: ICD-10-CM

## 2023-12-19 PROCEDURE — 99204 OFFICE O/P NEW MOD 45 MIN: CPT | Mod: VID | Performed by: FAMILY MEDICINE

## 2023-12-19 NOTE — PROGRESS NOTES
Svetlana is a 42 year old who is being evaluated via a billable video visit.      How would you like to obtain your AVS? MyChart  If the video visit is dropped, the invitation should be resent by: Send to e-mail at: [email or web address]  Will anyone else be joining your video visit? No        Assessment & Plan     (E66.01) Morbid obesity (H)  (primary encounter diagnosis)  with  (Z68.44) BMI 60.0-69.9, adult (H)  Comorbidity includes  (M17.11) Primary osteoarthritis of right knee  Comment: with cycle of reduced activity due to pain, increasing weight due to decreased activity worsening knee pain  Plan: will obtain labs as below, I discussed options including Ozempic and other weight loss medications, with ongoing insurance coverage (or current coverage) unclear. She will check with insurance re: coverage.  Obtain labs as below, Will fu as indicated.   I will prescribe Ozempic vs diabetic medication based on result of A1C below.    (E55.9) Vitamin D deficiency  Comment: noted previously , recheck today.  Plan: Comprehensive metabolic panel (BMP + Alb, Alk         Phos, ALT, AST, Total. Bili, TP), Vitamin D         Deficiency          (E88.09,  E46) Hypoalbuminemia due to protein-calorie malnutrition (H24)  Comment: noted previously  Plan: Comprehensive metabolic panel (BMP + Alb, Alk         Phos, ALT, AST, Total. Bili, TP), Prealbumin        Will fu as indicated.     (R73.01) Impaired fasting glucose  Comment: last A1C 5+ years ago, recheck.  Plan: Comprehensive metabolic panel (BMP + Alb, Alk         Phos, ALT, AST, Total. Bili, TP), Hemoglobin         A1c        Will fu as indicated.     (Z13.6) CARDIOVASCULAR SCREENING; LDL GOAL LESS THAN 160  Comment:   LDL Cholesterol Calculated   Date Value Ref Range Status   08/24/2018 74 <100 mg/dL Final     Comment:     Desirable:       <100 mg/dl      Recent Labs   Lab Test 08/24/18  1044   CHOL 146   HDL 49*   LDL 74   TRIG 116       Plan: Lipid panel reflex to direct LDL  "Fasting        Recheck, address as indicated    (D50.8) Iron deficiency anemia secondary to inadequate dietary iron intake  Comment: noted in chart, recheck today.  Plan: CBC with platelets, Iron and iron binding         capacity, Vitamin B12, Folate        Will fu as indicated.      Edel Herzog MD  [facility] ALCIDES Mota is a 42 year old, presenting for the following health issues: She previously saw Dr. El, last OV 2018.    Estimated body mass index is 65.01 kg/m  as calculated from the following:    Height as of 8/6/19: 1.6 m (5' 3\").    Weight as of 8/6/19: 166.5 kg (367 lb).   Medication Request      12/19/2023     7:33 AM   Additional Questions   Roomed by Jess HAHN   Impaired fasting glucose Follow-up    Patient is checking blood sugars: not at all  Diabetic concerns: None   Symptoms of hypoglycemia (low blood sugar): none   Paresthesias (numbness or burning in feet) or sores: No    Lab Results   Component Value Date    A1C 6.0 06/06/2018      She's been struggling with her weight for many years, was looking into gastric sleeve 2018 but then was able to lose weight with lifestyle changes and didn't have the surgery. She uses protein shakes and incorporated more exercise.  Her partner has been struggling with health issues, and then with covid she's had trouble staying active.  She reports that her current weight is up 30 lbs, her highest weight has been 401.  She injured her right knee in 2019 and was told she had OA. She has significant pain that limits activity, initially was unable to walk. Joint injections have helped her walk but still she has pain that limits activity. She has been told that she's be a candidate for knee replacement in the future but has to lose weight.    Her job is sedentary, so she's sitting for most of the day.  She works from home 3 days per week.  She eats regular meals, has waffles or eggs or turkey sausage when she eats " "breakfast, sometimes skips breakfast. She has noodles/burger/sandwiches for lunch. Dinner is usually chicken or fish with vegetables, minimal pasta.    Wt Readings from Last 4 Encounters:   10/29/18 (!) 162.4 kg (358 lb)   09/20/18 (!) 162.4 kg (358 lb)   09/13/18 (!) 162.6 kg (358 lb 8 oz)   08/24/18 (!) 164.7 kg (363 lb)         History of Present Illness       Reason for visit:  Weight loss medication    She eats 2-3 servings of fruits and vegetables daily.She consumes 1 sweetened beverage(s) daily.She exercises with enough effort to increase her heart rate 10 to 19 minutes per day.  She exercises with enough effort to increase her heart rate 3 or less days per week.   She is taking medications regularly.       Review of Systems   Constitutional, HEENT, cardiovascular, pulmonary, gi and gu systems are negative, except as otherwise noted.      Objective    Vitals - Patient Reported  Weight (Patient Reported): 176.7 kg (389 lb 8 oz)  Height (Patient Reported): 152.9 cm (5' 0.2\")  BMI (Based on Pt Reported Ht/Wt): 75.56  Pain Score: Severe Pain (6)  Pain Loc: Knee        Physical Exam   GENERAL: Healthy, alert and no distress  EYES: Eyes grossly normal to inspection.  No discharge or erythema, or obvious scleral/conjunctival abnormalities.  RESP: No audible wheeze, cough, or visible cyanosis.  No visible retractions or increased work of breathing.    SKIN: Visible skin clear. No significant rash, abnormal pigmentation or lesions.  NEURO: Cranial nerves grossly intact.  Mentation and speech appropriate for age.  PSYCH: Mentation appears normal, affect normal/bright, judgement and insight intact, normal speech and appearance well-groomed.          Video-Visit Details    Type of service:  Video Visit     Originating Location (pt. Location): Home    Distant Location (provider location):  Off-site  Platform used for Video Visit: Chris"

## 2023-12-20 ENCOUNTER — LAB (OUTPATIENT)
Dept: LAB | Facility: CLINIC | Age: 43
End: 2023-12-20
Attending: FAMILY MEDICINE
Payer: COMMERCIAL

## 2023-12-20 DIAGNOSIS — E55.9 VITAMIN D DEFICIENCY: ICD-10-CM

## 2023-12-20 DIAGNOSIS — D50.8 IRON DEFICIENCY ANEMIA SECONDARY TO INADEQUATE DIETARY IRON INTAKE: ICD-10-CM

## 2023-12-20 DIAGNOSIS — Z11.59 NEED FOR HEPATITIS C SCREENING TEST: Primary | ICD-10-CM

## 2023-12-20 DIAGNOSIS — E46 HYPOALBUMINEMIA DUE TO PROTEIN-CALORIE MALNUTRITION (H): ICD-10-CM

## 2023-12-20 DIAGNOSIS — Z13.6 CARDIOVASCULAR SCREENING; LDL GOAL LESS THAN 160: ICD-10-CM

## 2023-12-20 DIAGNOSIS — E88.09 HYPOALBUMINEMIA DUE TO PROTEIN-CALORIE MALNUTRITION (H): ICD-10-CM

## 2023-12-20 DIAGNOSIS — R73.01 IMPAIRED FASTING GLUCOSE: ICD-10-CM

## 2023-12-20 LAB
ALBUMIN SERPL BCG-MCNC: 3.7 G/DL (ref 3.5–5.2)
ALP SERPL-CCNC: 102 U/L (ref 40–150)
ALT SERPL W P-5'-P-CCNC: 11 U/L (ref 0–50)
ANION GAP SERPL CALCULATED.3IONS-SCNC: 11 MMOL/L (ref 7–15)
AST SERPL W P-5'-P-CCNC: 16 U/L (ref 0–45)
BILIRUB SERPL-MCNC: 0.2 MG/DL
BUN SERPL-MCNC: 12.4 MG/DL (ref 6–20)
CALCIUM SERPL-MCNC: 9.5 MG/DL (ref 8.6–10)
CHLORIDE SERPL-SCNC: 101 MMOL/L (ref 98–107)
CHOLEST SERPL-MCNC: 160 MG/DL
CREAT SERPL-MCNC: 0.66 MG/DL (ref 0.51–0.95)
DEPRECATED HCO3 PLAS-SCNC: 27 MMOL/L (ref 22–29)
EGFRCR SERPLBLD CKD-EPI 2021: >90 ML/MIN/1.73M2
ERYTHROCYTE [DISTWIDTH] IN BLOOD BY AUTOMATED COUNT: 15.2 % (ref 10–15)
FASTING STATUS PATIENT QL REPORTED: YES
FOLATE SERPL-MCNC: 5.5 NG/ML (ref 4.6–34.8)
GLUCOSE SERPL-MCNC: 90 MG/DL (ref 70–99)
HBA1C MFR BLD: 5.9 % (ref 0–5.6)
HCT VFR BLD AUTO: 41.6 % (ref 35–47)
HCV AB SERPL QL IA: NONREACTIVE
HDLC SERPL-MCNC: 52 MG/DL
HGB BLD-MCNC: 12.4 G/DL (ref 11.7–15.7)
LDLC SERPL CALC-MCNC: 90 MG/DL
MCH RBC QN AUTO: 23.5 PG (ref 26.5–33)
MCHC RBC AUTO-ENTMCNC: 29.8 G/DL (ref 31.5–36.5)
MCV RBC AUTO: 79 FL (ref 78–100)
NONHDLC SERPL-MCNC: 108 MG/DL
PLATELET # BLD AUTO: 502 10E3/UL (ref 150–450)
POTASSIUM SERPL-SCNC: 4.8 MMOL/L (ref 3.4–5.3)
PREALB SERPL-MCNC: 17.8 MG/DL (ref 20–40)
PROT SERPL-MCNC: 7.9 G/DL (ref 6.4–8.3)
RBC # BLD AUTO: 5.27 10E6/UL (ref 3.8–5.2)
SODIUM SERPL-SCNC: 139 MMOL/L (ref 135–145)
TRIGL SERPL-MCNC: 90 MG/DL
VIT B12 SERPL-MCNC: 748 PG/ML (ref 232–1245)
VIT D+METAB SERPL-MCNC: 17 NG/ML (ref 20–50)
WBC # BLD AUTO: 10.7 10E3/UL (ref 4–11)

## 2023-12-20 PROCEDURE — 86803 HEPATITIS C AB TEST: CPT

## 2023-12-20 PROCEDURE — 83550 IRON BINDING TEST: CPT

## 2023-12-20 PROCEDURE — 84134 ASSAY OF PREALBUMIN: CPT

## 2023-12-20 PROCEDURE — 85027 COMPLETE CBC AUTOMATED: CPT

## 2023-12-20 PROCEDURE — 36415 COLL VENOUS BLD VENIPUNCTURE: CPT

## 2023-12-20 PROCEDURE — 80053 COMPREHEN METABOLIC PANEL: CPT

## 2023-12-20 PROCEDURE — 83540 ASSAY OF IRON: CPT

## 2023-12-20 PROCEDURE — 82306 VITAMIN D 25 HYDROXY: CPT

## 2023-12-20 PROCEDURE — 82607 VITAMIN B-12: CPT

## 2023-12-20 PROCEDURE — 83036 HEMOGLOBIN GLYCOSYLATED A1C: CPT

## 2023-12-20 PROCEDURE — 80061 LIPID PANEL: CPT

## 2023-12-20 PROCEDURE — 82746 ASSAY OF FOLIC ACID SERUM: CPT

## 2023-12-21 LAB
IRON BINDING CAPACITY (ROCHE): 413 UG/DL (ref 240–430)
IRON SATN MFR SERPL: 9 % (ref 15–46)
IRON SERPL-MCNC: 36 UG/DL (ref 37–145)

## 2023-12-27 ENCOUNTER — MYC MEDICAL ADVICE (OUTPATIENT)
Dept: FAMILY MEDICINE | Facility: CLINIC | Age: 43
End: 2023-12-27
Payer: COMMERCIAL

## 2023-12-27 DIAGNOSIS — R73.01 IMPAIRED FASTING GLUCOSE: ICD-10-CM

## 2023-12-27 DIAGNOSIS — M17.11 PRIMARY OSTEOARTHRITIS OF RIGHT KNEE: ICD-10-CM

## 2023-12-27 DIAGNOSIS — E66.01 MORBID OBESITY (H): Primary | ICD-10-CM

## 2023-12-28 NOTE — TELEPHONE ENCOUNTER
Dr. Herzog--    See pts message regarding insurance coverage for weight loss injectables.    Sounds like they would cover wegovy but likely not ozempic     Awaiting on pts response for preferred pharmacy.   (Triage-- send to PCP once patient clarifies pharmacy)    Shannan Johnson, GENEVAN RN  [facility]

## 2024-01-02 DIAGNOSIS — E55.9 VITAMIN D DEFICIENCY: Primary | ICD-10-CM

## 2024-01-02 DIAGNOSIS — E88.09 HYPOALBUMINEMIA DUE TO PROTEIN-CALORIE MALNUTRITION (H): ICD-10-CM

## 2024-01-02 DIAGNOSIS — D50.8 IRON DEFICIENCY ANEMIA SECONDARY TO INADEQUATE DIETARY IRON INTAKE: ICD-10-CM

## 2024-01-02 DIAGNOSIS — E46 HYPOALBUMINEMIA DUE TO PROTEIN-CALORIE MALNUTRITION (H): ICD-10-CM

## 2024-01-02 RX ORDER — ERGOCALCIFEROL 1.25 MG/1
50000 CAPSULE, LIQUID FILLED ORAL WEEKLY
Qty: 12 CAPSULE | Refills: 0 | Status: SHIPPED | OUTPATIENT
Start: 2024-01-02 | End: 2024-03-20

## 2024-01-02 NOTE — RESULT ENCOUNTER NOTE
Thank you very much for getting labs done!    You have iron deficient anemia.  I recommend taking an iron supplement, 325 mg, by mouth three times per day, with an acid such as orange or tomato juice. Do not take this at the same time as milk, Tums, or acid-blocking medication. Iron-rich foods include beef and other meats, beans, lentils, iron-fortified cereals, dark green leafy vegetables and dried fruit. You can improve your iron levels by adding more of these foods to your diet.    Your prealbumin level is low, indicating that may not be getting enough protein in your diet. . I recommend trying to increase healthy proteins in your diet including low fat dairy products, beans, fish, occasional eggs, chicken and turkey.    Your vitamin D level is low.    As you may know, vitamin D deficiency is associated with many medical problems including osteoporosis, muscles aches and pains, weakness and falls.  Maintaining adequate levels is very important for good health.  During winter months (October through March), people in northern climates are not able to synthesize vitamin D from sunlight and therefore have higher rates of deficiency due to inadquate oral intake. Vitamin D deficiency is very common in [place]!    I recommend taking a high dose supplement for 3 months, 50,000 units once per week.  You could consider rechecking your level when you have finished this course. I will send a prescription to your pharmacy, [facility] [address] in [place].     After you are done with the high dose,  I recommend taking a daily supplement of 2000 units daily.      Please schedule a lab appointment in 2-3 months to recheck your labs.    If you have any questions, please contact the clinic or schedule an appointment with me, thank you!    Sincerely,    Edel Herzog MD

## 2024-01-23 DIAGNOSIS — R73.01 IMPAIRED FASTING GLUCOSE: ICD-10-CM

## 2024-01-23 DIAGNOSIS — M17.11 PRIMARY OSTEOARTHRITIS OF RIGHT KNEE: ICD-10-CM

## 2024-01-23 DIAGNOSIS — E66.01 MORBID OBESITY (H): ICD-10-CM

## 2024-02-04 ENCOUNTER — HEALTH MAINTENANCE LETTER (OUTPATIENT)
Age: 44
End: 2024-02-04

## 2024-06-23 ENCOUNTER — HEALTH MAINTENANCE LETTER (OUTPATIENT)
Age: 44
End: 2024-06-23

## 2024-06-25 ENCOUNTER — MYC REFILL (OUTPATIENT)
Dept: FAMILY MEDICINE | Facility: CLINIC | Age: 44
End: 2024-06-25
Payer: COMMERCIAL

## 2024-06-25 DIAGNOSIS — M17.11 PRIMARY OSTEOARTHRITIS OF RIGHT KNEE: ICD-10-CM

## 2024-06-25 DIAGNOSIS — E66.01 MORBID OBESITY (H): ICD-10-CM

## 2024-06-25 DIAGNOSIS — R73.01 IMPAIRED FASTING GLUCOSE: ICD-10-CM

## 2024-12-15 ENCOUNTER — HOSPITAL ENCOUNTER (EMERGENCY)
Facility: CLINIC | Age: 44
Discharge: HOME | End: 2024-12-15
Attending: EMERGENCY MEDICINE | Admitting: EMERGENCY MEDICINE
Payer: COMMERCIAL

## 2024-12-15 ENCOUNTER — APPOINTMENT (OUTPATIENT)
Dept: GENERAL RADIOLOGY | Facility: CLINIC | Age: 44
End: 2024-12-15
Attending: EMERGENCY MEDICINE
Payer: COMMERCIAL

## 2024-12-15 VITALS
RESPIRATION RATE: 17 BRPM | HEART RATE: 96 BPM | OXYGEN SATURATION: 98 % | SYSTOLIC BLOOD PRESSURE: 170 MMHG | DIASTOLIC BLOOD PRESSURE: 105 MMHG | TEMPERATURE: 98.6 F | WEIGHT: 293 LBS | HEIGHT: 62 IN | BODY MASS INDEX: 53.92 KG/M2

## 2024-12-15 DIAGNOSIS — J06.9 VIRAL UPPER RESPIRATORY TRACT INFECTION: ICD-10-CM

## 2024-12-15 DIAGNOSIS — J04.0 LARYNGITIS: ICD-10-CM

## 2024-12-15 DIAGNOSIS — R05.1 ACUTE COUGH: ICD-10-CM

## 2024-12-15 LAB
FLUAV RNA SPEC QL NAA+PROBE: NEGATIVE
FLUBV RNA RESP QL NAA+PROBE: NEGATIVE
HOLD SPECIMEN: NORMAL
RSV RNA SPEC NAA+PROBE: NEGATIVE
SARS-COV-2 RNA RESP QL NAA+PROBE: NEGATIVE

## 2024-12-15 PROCEDURE — 250N000009 HC RX 250: Performed by: EMERGENCY MEDICINE

## 2024-12-15 PROCEDURE — 99285 EMERGENCY DEPT VISIT HI MDM: CPT | Mod: 25 | Performed by: EMERGENCY MEDICINE

## 2024-12-15 PROCEDURE — 87637 SARSCOV2&INF A&B&RSV AMP PRB: CPT | Performed by: EMERGENCY MEDICINE

## 2024-12-15 PROCEDURE — 93005 ELECTROCARDIOGRAM TRACING: CPT | Performed by: EMERGENCY MEDICINE

## 2024-12-15 PROCEDURE — 71046 X-RAY EXAM CHEST 2 VIEWS: CPT

## 2024-12-15 RX ORDER — ALBUTEROL SULFATE 0.83 MG/ML
2.5 SOLUTION RESPIRATORY (INHALATION)
Status: COMPLETED | OUTPATIENT
Start: 2024-12-15 | End: 2024-12-15

## 2024-12-15 RX ORDER — ALBUTEROL SULFATE 90 UG/1
2 INHALANT RESPIRATORY (INHALATION) EVERY 6 HOURS PRN
Qty: 18 G | Refills: 0 | Status: SHIPPED | OUTPATIENT
Start: 2024-12-15

## 2024-12-15 RX ADMIN — ALBUTEROL SULFATE 2.5 MG: 2.5 SOLUTION RESPIRATORY (INHALATION) at 16:54

## 2024-12-15 ASSESSMENT — COLUMBIA-SUICIDE SEVERITY RATING SCALE - C-SSRS
6. HAVE YOU EVER DONE ANYTHING, STARTED TO DO ANYTHING, OR PREPARED TO DO ANYTHING TO END YOUR LIFE?: NO
2. HAVE YOU ACTUALLY HAD ANY THOUGHTS OF KILLING YOURSELF IN THE PAST MONTH?: NO
1. IN THE PAST MONTH, HAVE YOU WISHED YOU WERE DEAD OR WISHED YOU COULD GO TO SLEEP AND NOT WAKE UP?: NO

## 2024-12-15 ASSESSMENT — ACTIVITIES OF DAILY LIVING (ADL)
ADLS_ACUITY_SCORE: 41
ADLS_ACUITY_SCORE: 41

## 2024-12-15 NOTE — ED TRIAGE NOTES
Pt complains of cough, sob on exertion, voice loss. Pt states symptoms originally started three days ago. Pt had sick contacts w/ cold like symptoms.

## 2024-12-15 NOTE — ED PROVIDER NOTES
"  Emergency Department Note      History of Present Illness     Chief Complaint   Shortness of Breath      HPI   Svetlana Perry is a 43 year old female with history of morbid obesity who presents to the ED for shortness of breath. The patient reports that she developed a scratchy throat after attending her niece's [employer] concert a few days ago. Over the following days, the patient has developed additional symptoms of a nonproductive cough, congestion, and other cold symptoms; chest tightness, mild shortness of breath, and losing her voice. She also experienced a small episode of emesis yesterday. Later the patient learned that her niece was diagnosed with RSV. Denies chest pain and sore throat. At one point after onset of her symptoms, the patient took an at home Covid test which came back negative. No history of asthma and heart disease. She is taking Wegovy.     Independent Historian   None    Review of External Notes   I reviewed the e visit from 12/19/23.     Past Medical History     Medical History and Problem List   Morbid obesity   Gallstone   Anxiety   Depression  Iron deficiency anemia   Vitamin D deficiency   Arthritis  Esophageal reflux    Medications   Wegovy     Surgical History   Cholecystectomy   Nephrostomy   Breast reduction  Colostomy     Physical Exam     Patient Vitals for the past 24 hrs:   BP Temp Temp src Pulse Resp SpO2 Height Weight   12/15/24 1830 -- -- -- -- -- 98 % -- --   12/15/24 1815 -- -- -- -- -- 99 % -- --   12/15/24 1800 (!) 170/105 -- -- 96 -- 99 % -- --   12/15/24 1745 (!) 168/109 -- -- 99 -- 98 % -- --   12/15/24 1730 (!) 158/108 -- -- 94 -- 98 % -- --   12/15/24 1700 (!) 160/115 -- -- 90 -- 100 % -- --   12/15/24 1611 (!) 184/111 98.6  F (37  C) Oral 109 17 98 % 1.575 m (5' 2\") (!) 188 kg (414 lb 7.4 oz)     Physical Exam  General: Resting on the bed.  Head: No obvious trauma to head.  Ears, Nose, Throat:  External ears normal.  Nose normal.  No pharyngeal erythema, " swelling or exudate.  Midline uvula.    Eyes:  Conjunctivae clear.  Pupils are equal, round, and reactive.   Neck: Normal range of motion.  Neck supple.  No nuchal rigidity   CV: Regular rate and rhythm.  No murmurs.      Respiratory: Effort normal and breath sounds normal.  No wheezing or crackles.   Gastrointestinal: Soft.  No distension. There is no tenderness.    Neuro: Alert. Moving all extremities appropriately.  Normal speech.    Skin: Skin is warm and dry.  No rash noted.     Diagnostics     Lab Results   Labs Ordered and Resulted from Time of ED Arrival to Time of ED Departure   INFLUENZA A/B, RSV AND SARS-COV2 PCR - Normal       Result Value    Influenza A PCR Negative      Influenza B PCR Negative      RSV PCR Negative      SARS CoV2 PCR Negative         Imaging   XR Chest 2 Views   Final Result   IMPRESSION: Shallow inspiration. Heart size upper limits of normal accentuated by shallow inspiration. Benign calcified granuloma left upper lobe unchanged.      No acute new findings.          EKG   ECG taken at 1815, ECG read at 1820  Normal sinus rhythm   No significant change as compared to prior, dated 7/19/07.  Rate 99 bpm. MI interval 180 ms. QRS duration 86 ms. QT/QTc 386/495 ms. P-R-T axes 54 -13 52.    Independent Interpretation   CXR: No pneumothorax, infiltrate, pleural effusion, or pulmonary edema.    ED Course      Medications Administered   Medications   albuterol (PROVENTIL) neb solution 2.5 mg (2.5 mg Nebulization $Given 12/15/24 1654)       Procedures   Procedures     Discussion of Management   None    ED Course   ED Course as of 12/15/24 1957   Sun Dec 15, 2024   1636 I obtained history and performed physical exam as noted above.    1830 I reevaluated the patient. She is feeling better.The inhaler was very helpful.       Additional Documentation  None    Medical Decision Making / Diagnosis     CMS Diagnoses: None    MIPS       None    MDM   Devanggildardo Perry is a 43 year old female who  presents to the Emergency Department with cough.  Vital signs mildly hypertensive.  Reviewed patient's heart rate, she reports that she is normally in the 90s.  This is her baseline. Broad differential was considered including not limited to pneumonia, pneumothorax, effusion, PE, ACS arrhythmia, reactive airway disease, asthma, etc. COVID, influenza, RSV is negative.  Chest x-ray clear without evidence acute pneumonia, pneumothorax or effusion.  Considered PE but no hypoxia, no pleuritic chest pain, symptoms do not appear consistent with PE.  EKG showing sinus rhythm.  No acute ischemic change.  History does not appear consistent with ACS along with reassuring EKG I do not think that additional workup is required.  Patient symptoms may seem most consistent with a upper respiratory infection.  She does have some laryngitis as well.  No throat pain, posterior oropharynx looks clear, no signs of retropharyngeal abscess, tonsillitis, epiglottitis or other deep space infection.  Patient feels improved after a nebulizer treatment.  No wheezing on exam to indicate reactive airway disease or asthma.  Recommend supportive care.  Inhaler given for comfort.  Return precautions provided.  With shared decision making we decided to continue to treat with supportive cares at home.  Close follow-up with primary doctor.  Patient is agreeable.  Return if worsening chest pain, shortness breath etc.    Disposition   The patient was discharged.     Diagnosis     ICD-10-CM    1. Viral upper respiratory tract infection  J06.9       2. Acute cough  R05.1       3. Laryngitis  J04.0            Discharge Medications   Discharge Medication List as of 12/15/2024  6:43 PM        START taking these medications    Details   albuterol (PROAIR HFA/PROVENTIL HFA/VENTOLIN HFA) 108 (90 Base) MCG/ACT inhaler Inhale 2 puffs into the lungs every 6 hours as needed for shortness of breath, wheezing or cough., Disp-18 g, R-0, [facility] may dispense  brand covered by insurance (Proair, or proventil or ventolin or generic albuterol inhaler)               Scribe Disclosure:  I, Nilo Mack, am serving as a scribe at 7:28 PM on 12/15/2024 to document services personally performed by No att. providers found based on my observations and the provider's statements to me.        Narcisa Khan MD  12/16/24 0048

## 2024-12-16 LAB
ATRIAL RATE - MUSE: 99 BPM
DIASTOLIC BLOOD PRESSURE - MUSE: NORMAL MMHG
INTERPRETATION ECG - MUSE: NORMAL
P AXIS - MUSE: 54 DEGREES
PR INTERVAL - MUSE: 180 MS
QRS DURATION - MUSE: 86 MS
QT - MUSE: 386 MS
QTC - MUSE: 495 MS
R AXIS - MUSE: -13 DEGREES
SYSTOLIC BLOOD PRESSURE - MUSE: NORMAL MMHG
T AXIS - MUSE: 52 DEGREES
VENTRICULAR RATE- MUSE: 99 BPM

## 2024-12-16 NOTE — DISCHARGE INSTRUCTIONS
Please return to the ED if you have worsening cough, fevers >101, chest pain, shortness of breath, intractable vomiting, or other acute changes.  Please follow up with your PCP in the next 2-3 days.      Use tylenol and ibuprofen for pain.  Drink plenty of fluids and rest.      We also noticed that your blood pressure is running high.  This is something that should be rechecked by your primary provider.    May use an inhaler every 4-6 hours as needed for cough.  Drink plenty of fluids.  Use a humidifier to help with hydration.  Honey may be helpful for your throat as well.    Discharge Instructions  Upper Respiratory Infection    The upper respiratory tract includes the sinuses, nasal passages, pharynx, and larynx. A URI, or upper respiratory infection, is an infection of any of the parts of the upper airway. Symptoms include runny nose, congestion, sneezing, sore throat, cough, and fever. URIs are almost always caused by a virus. Antibiotics do not help with viral infections, so are generally not prescribed. A URI is very contagious through coughing and nasal secretions; make sure you wash your hands often and clean surfaces after sneezing, coughing or touching them. While you should start to improve in 3 - 5 days, remember that sometimes a cough can linger for several weeks.    Generally, every Emergency Department visit should have a follow-up clinic visit with either a primary or a specialty clinic/provider. Please follow-up as instructed by your emergency provider today.    Return to the Emergency Department if:  Any of your symptoms get much worse.  You seem very sick, like being too weak to get up.  You have chest pain or shortness of breath.   You have a severe headache.  You are vomiting (throwing up) so much you cannot keep fluids or medicines down.  You have confusion or seem unusually drowsy.  You have a seizure.    What can I do to help myself?  Fill any prescriptions the provider gave you and take them  right away  If you have a fever, get plenty of rest and drink lots of fluids, especially water.  Using a humidifier or saline nose spray will also help loosen mucous.   Clothes or blankets will not change your fever. Do what is comfortable for you.  Bathing or sponging in lukewarm water may help you feel better.  Acetaminophen (Tylenol ) or ibuprofen (Advil , Motrin ) will help bring fever down and may help you feel more comfortable. Be sure to read and follow the package directions, and ask your provider if you have questions.  Do not drink alcohol.  Decongestants may help you feel better. You may use decongestant nose sprays Afrin  (oxymetazoline) or Reid-Synephrine  (phenylephrine hydrochloride) for up to 3 days, or may use a decongestant tablet like Sudafed  (pseudoephedrine).  If you were given a prescription for medicine here today, be sure to read all of the information (including the package insert) that comes with your prescription.  This will include important information about the medicine, its side effects, and any warnings that you need to know about.  The pharmacist who fills the prescription can provide more information and answer questions you may have about the medicine.  If you have questions or concerns that the pharmacist cannot address, please call or return to the Emergency Department.   Remember that you can always come back to the Emergency Department if you are not able to see your regular provider in the amount of time listed above, if you get any new symptoms, or if there is anything that worries you.

## 2025-07-12 ENCOUNTER — HEALTH MAINTENANCE LETTER (OUTPATIENT)
Age: 45
End: 2025-07-12